# Patient Record
Sex: FEMALE | Race: WHITE | Employment: FULL TIME | URBAN - METROPOLITAN AREA
[De-identification: names, ages, dates, MRNs, and addresses within clinical notes are randomized per-mention and may not be internally consistent; named-entity substitution may affect disease eponyms.]

---

## 2017-01-23 ENCOUNTER — OFFICE VISIT (OUTPATIENT)
Dept: INTERNAL MEDICINE CLINIC | Age: 57
End: 2017-01-23

## 2017-01-23 VITALS
HEART RATE: 61 BPM | WEIGHT: 178 LBS | SYSTOLIC BLOOD PRESSURE: 129 MMHG | DIASTOLIC BLOOD PRESSURE: 81 MMHG | HEIGHT: 66 IN | BODY MASS INDEX: 28.61 KG/M2 | OXYGEN SATURATION: 97 % | TEMPERATURE: 97.7 F | RESPIRATION RATE: 17 BRPM

## 2017-01-23 DIAGNOSIS — I10 ESSENTIAL HYPERTENSION: Primary | ICD-10-CM

## 2017-01-23 DIAGNOSIS — E55.9 VITAMIN D DEFICIENCY: ICD-10-CM

## 2017-01-23 DIAGNOSIS — E78.5 DYSLIPIDEMIA: ICD-10-CM

## 2017-01-23 DIAGNOSIS — R23.2 HOT FLASHES: ICD-10-CM

## 2017-01-23 DIAGNOSIS — G89.29 CHRONIC NECK PAIN: ICD-10-CM

## 2017-01-23 DIAGNOSIS — M54.2 CHRONIC NECK PAIN: ICD-10-CM

## 2017-01-23 DIAGNOSIS — E66.3 OVERWEIGHT (BMI 25.0-29.9): ICD-10-CM

## 2017-01-23 RX ORDER — VENLAFAXINE HYDROCHLORIDE 37.5 MG/1
37.5 CAPSULE, EXTENDED RELEASE ORAL DAILY
Qty: 30 CAP | Refills: 3 | Status: SHIPPED | OUTPATIENT
Start: 2017-01-23 | End: 2017-03-09 | Stop reason: DRUGHIGH

## 2017-01-23 RX ORDER — BISMUTH SUBSALICYLATE 262 MG
1 TABLET,CHEWABLE ORAL DAILY
COMMUNITY
End: 2018-07-05

## 2017-01-23 NOTE — PROGRESS NOTES
HISTORY OF PRESENT ILLNESS  Sneha Schneider is a 64 y.o. female. HPI   Last here 7/25/16.  Pt is here to f/u on chronic conditions  Pt is fasting today    BP today is 129/81  Pt is not monitoring BP at home  Continues losartan 100mg daily     Pt c/o mood swings recently with hot flashes  The hot flashes have been worsening recently and she had increasing redness to her arms and face  This wakes her up at night occasionally as well   Discussed I do not recommend hormonal therapies, discussed increased risk of clotting and cancers  Discussed effexor to improve hot flashes and stabilize mood swings, discussed other treatment options as well to include brisdelle and gabapentin   Advised starting 37.5mg daily at this time and can increase up as needed down the road   She is amenable to starting this medication, will take qhs   Discussed if needed she can double this in about 6 weeks and she can call office so I can adjust the prescription for her- she expresses understanding    Reviewed last labs 7/16  Will repeat labs today- pt is fasting    Wt is up 9 lbs since last visit   She had lost about 20 lbs but has gained some wt back  Pt has been exercising less frequently recently d/t neck pain   Discussed diet and weight loss   Discussed her cholesterol may be elevated with the weight gain  Discussed effexor is weight neutral and should not contribute to weight gain    Pt has chronic neck pain and stiffness, worsening recently   She follows with orthopedics for this- Dr. Mikala Mitchell   She has gotten injections for this in the past   She has not been taking anything OTC for this pain   Pt will follow up with Dr. Mikala Mitchell for her neck pain   Pt states recent worsening in neck pain is prohibiting her exercise  She will schedule follow up appointment     Continues vit D 1000 units daily   She also takes centrum silver multi-vitamin daily     Reviewed DEXA 8/10/16: normal  Will likely wait to repeat in about 4 years d/t normal result and young age       PREVENTIVE:    Colonoscopy: 9/14, Dr. Jackie Reyes, repeat 10 years  Pap: Dr. Richard Aparicio, 1/13, declines further  Mammogram: declines  Dexa: 8/10/16 normal  Flu shot: declines    Eye exam: 12/16/15, every other year  Lipids: 7/16,           Patient Active Problem List    Diagnosis Date Noted    Urgency of urination 09/06/2013    Nocturia 09/06/2013    Mixed incontinence 09/06/2013    Cystocele, lateral 09/06/2013    Rectocele 09/06/2013    Postmenopausal atrophic vaginitis 09/06/2013    Obesity 02/12/2013    Infected sebaceous cyst Left post auricular. 08/24/2011    HTN (hypertension) 12/15/2009    Chronic neck pain 12/15/2009    DJD (degenerative joint disease) 12/15/2009    Vitamin D deficiency 12/15/2009    Fibrocystic disease of breast 12/15/2009     Current Outpatient Prescriptions   Medication Sig Dispense Refill    losartan (COZAAR) 100 mg tablet Take 1 Tab by mouth daily. 30 Tab 6    Cholecalciferol, Vitamin D3, (VITAMIN D3) 1,000 unit cap Take  by mouth daily.        Past Surgical History   Procedure Laterality Date    Hx hysterectomy       partial    Hx orthopaedic       right thumb fracture repair    Hx cyst removal  02/2015     from jaw and back     Hx cyst removal  02/2015     from finger      Lab Results  Component Value Date/Time   WBC 5.9 07/25/2016 01:39 PM   HGB 13.1 07/25/2016 01:39 PM   HCT 39.0 07/25/2016 01:39 PM   PLATELET 332 84/75/4524 01:39 PM   MCV 91 07/25/2016 01:39 PM       Lab Results  Component Value Date/Time   Cholesterol, total 216 07/25/2016 01:39 PM   HDL Cholesterol 70 07/25/2016 01:39 PM   LDL, calculated 111 07/25/2016 01:39 PM   Triglyceride 174 07/25/2016 01:39 PM       Lab Results  Component Value Date/Time   GFR est  07/25/2016 01:39 PM   GFR est non-AA 92 07/25/2016 01:39 PM   Creatinine 0.73 07/25/2016 01:39 PM   BUN 16 07/25/2016 01:39 PM   Sodium 140 07/25/2016 01:39 PM   Potassium 4.9 07/25/2016 01:39 PM   Chloride 98 07/25/2016 01:39 PM   CO2 24 07/25/2016 01:39 PM         Review of Systems   Respiratory: Negative for shortness of breath. Cardiovascular: Negative for chest pain. Musculoskeletal: Positive for neck pain. Physical Exam   Constitutional: She is oriented to person, place, and time. She appears well-developed and well-nourished. No distress. HENT:   Head: Normocephalic and atraumatic. Mouth/Throat: Oropharynx is clear and moist. No oropharyngeal exudate. Eyes: Conjunctivae and EOM are normal. Right eye exhibits no discharge. Left eye exhibits no discharge. Neck: Normal range of motion. Neck supple. Cardiovascular: Normal rate, regular rhythm, normal heart sounds and intact distal pulses. Exam reveals no gallop and no friction rub. No murmur heard. Pulmonary/Chest: Effort normal and breath sounds normal. No respiratory distress. She has no wheezes. She has no rales. She exhibits no tenderness. Musculoskeletal: Normal range of motion. She exhibits no edema, tenderness or deformity. Lymphadenopathy:     She has no cervical adenopathy. Neurological: She is alert and oriented to person, place, and time. Coordination normal.   Skin: Skin is warm and dry. No rash noted. She is not diaphoretic. No erythema. No pallor. Psychiatric: She has a normal mood and affect. Her behavior is normal.       ASSESSMENT and PLAN    ICD-10-CM ICD-9-CM    1. Essential hypertension    Well controlled on losartan 100mg daily, continue no change to dose   Q12 407.5 METABOLIC PANEL, COMPREHENSIVE   2. Hot flashes    Will start effexor 37.5mg daily, discussed can increase to 75mg after 6 weeks if needed, if this does not work will consider low dose paxil next. R23.2 782.62    3.  Dyslipidemia    Diet controlled, however she gained 9 lbs since last visit, will focus on weight loss, no medications needed for now, repeat lipids today   E78.5 272.4 LIPID PANEL   4. Vitamin D deficiency    Taking 1000 units per day, at goal last check   E55.9 268.9    5. Overweight (BMI 25.0-29. 9)    Wt is up 9 lbs, had holiday eating, will focus on portion control and increase exercise     E66.3 278.02    6. Chronic neck pain    Will schedule follow up with Dr. Mansi Tony for this, saw him around 2013 for this,  had some injections at that time which improved her sxs, this is recurrent issue for her. M54.2 723.1     G89.29 338.29         Written by Yoandy William, as dictated by Whitley Rolle MD.    Current diagnosis and concerns discussed with pt at length. Understands risks and benefits or current treatment plan and medications and accepts the treatment and medication with any possible risks.   Pt asks appropriate questions which were answered.   Pt instructed to call with any concerns or problems.

## 2017-01-24 LAB
ALBUMIN SERPL-MCNC: 4.2 G/DL (ref 3.5–5.5)
ALBUMIN/GLOB SERPL: 1.7 {RATIO} (ref 1.1–2.5)
ALP SERPL-CCNC: 79 IU/L (ref 39–117)
ALT SERPL-CCNC: 19 IU/L (ref 0–32)
AST SERPL-CCNC: 18 IU/L (ref 0–40)
BILIRUB SERPL-MCNC: 0.3 MG/DL (ref 0–1.2)
BUN SERPL-MCNC: 13 MG/DL (ref 6–24)
BUN/CREAT SERPL: 16 (ref 9–23)
CALCIUM SERPL-MCNC: 9.1 MG/DL (ref 8.7–10.2)
CHLORIDE SERPL-SCNC: 99 MMOL/L (ref 96–106)
CHOLEST SERPL-MCNC: 190 MG/DL (ref 100–199)
CO2 SERPL-SCNC: 25 MMOL/L (ref 18–29)
CREAT SERPL-MCNC: 0.79 MG/DL (ref 0.57–1)
GLOBULIN SER CALC-MCNC: 2.5 G/DL (ref 1.5–4.5)
GLUCOSE SERPL-MCNC: 104 MG/DL (ref 65–99)
HDLC SERPL-MCNC: 62 MG/DL
LDLC SERPL CALC-MCNC: 99 MG/DL (ref 0–99)
POTASSIUM SERPL-SCNC: 4.7 MMOL/L (ref 3.5–5.2)
PROT SERPL-MCNC: 6.7 G/DL (ref 6–8.5)
SODIUM SERPL-SCNC: 139 MMOL/L (ref 134–144)
TRIGL SERPL-MCNC: 144 MG/DL (ref 0–149)
VLDLC SERPL CALC-MCNC: 29 MG/DL (ref 5–40)

## 2017-03-09 RX ORDER — VENLAFAXINE HYDROCHLORIDE 75 MG/1
75 CAPSULE, EXTENDED RELEASE ORAL DAILY
Qty: 30 CAP | Refills: 6 | Status: SHIPPED | OUTPATIENT
Start: 2017-03-09 | End: 2018-01-24

## 2017-03-28 RX ORDER — LOSARTAN POTASSIUM 100 MG/1
100 TABLET ORAL DAILY
Qty: 30 TAB | Refills: 6 | Status: SHIPPED | OUTPATIENT
Start: 2017-03-28 | End: 2017-12-04 | Stop reason: SDUPTHER

## 2017-07-24 ENCOUNTER — OFFICE VISIT (OUTPATIENT)
Dept: INTERNAL MEDICINE CLINIC | Age: 57
End: 2017-07-24

## 2017-07-24 VITALS
RESPIRATION RATE: 17 BRPM | DIASTOLIC BLOOD PRESSURE: 90 MMHG | HEIGHT: 66 IN | HEART RATE: 64 BPM | WEIGHT: 174 LBS | SYSTOLIC BLOOD PRESSURE: 131 MMHG | OXYGEN SATURATION: 96 % | BODY MASS INDEX: 27.97 KG/M2 | TEMPERATURE: 97.5 F

## 2017-07-24 DIAGNOSIS — Z00.00 ANNUAL PHYSICAL EXAM: Primary | ICD-10-CM

## 2017-07-24 DIAGNOSIS — N95.1 HOT FLASH, MENOPAUSAL: ICD-10-CM

## 2017-07-24 DIAGNOSIS — M54.2 CHRONIC NECK PAIN: ICD-10-CM

## 2017-07-24 DIAGNOSIS — G89.29 CHRONIC NECK PAIN: ICD-10-CM

## 2017-07-24 DIAGNOSIS — I10 ESSENTIAL HYPERTENSION: ICD-10-CM

## 2017-07-24 RX ORDER — MELOXICAM 15 MG/1
15 TABLET ORAL DAILY
COMMUNITY
End: 2019-08-13 | Stop reason: SDUPTHER

## 2017-07-24 RX ORDER — CYCLOBENZAPRINE HCL 10 MG
TABLET ORAL
COMMUNITY
End: 2018-07-05

## 2017-07-24 RX ORDER — GABAPENTIN 100 MG/1
300 CAPSULE ORAL DAILY
COMMUNITY
End: 2019-02-13 | Stop reason: SDUPTHER

## 2017-07-24 NOTE — PROGRESS NOTES
HISTORY OF PRESENT ILLNESS  Jarad Diaz is a 62 y.o. female. HPI   Last here 17.  Pt is here to f/u on chronic conditions    BP today is 131/90  BP per ortho around 142/87 per pt  She is not monitoring her BP at home  Continues losartan 100mg daily     Pt c/o neck pain, chronic issue for her  She is following with Dr. Myesha Camilo for this   She is getting injections per Dr. Emma Rudd  Pt has steroid injections pending 17 for this   Pt is also now taking gabapentin 100mg BID- 1 in AM, 2 in PM, meloxicam, and flexeril  These medications are not fully controlling her pain currently   Discussed increasing dose of gabapentin, she declines for now    Reviewed last labs   LDL 99, kidney nl, liver nl, fasting sugar mildly elevated  Repeat labs today    Wt is down 4 lbs since last visit  Discussed diet and continued weight loss     Last visit, I started her on effexor 37.5mg daily for hot flashes  Over email I increased this to 75mg daily   She states this is not improving her sx    She does not notice improvement on higher dose  Discussed if not helping we can stop this for now  Advised how to taper off of effexor to avoid SE  Declines increase in gabapentin dose for this    Continues vit D 1000 units daily   She also takes centrum silver multi-vitamin daily         PREVENTIVE:    Colonoscopy: , Dr. Earnestine Ahumada, repeat 10 years  Pap: Dr. Joselin Levin, , declines further  Mammogram: declines  DEXA: 8/10/16 nl, will repeat 3-4 years  Tdap: declines  Pneumovax: not yet needed  [id number]: not yet needed  Zostavax: not yet needed  Flu shot: declines    Eye exam: 12/16/15, every other year  EK/16 normal sinus rhythm   Hep C screen:  negative  Lipids:  LDL 99       Patient Active Problem List    Diagnosis Date Noted    Urgency of urination 2013    Nocturia 2013    Mixed incontinence 2013    Cystocele, lateral 2013    Rectocele 2013    Postmenopausal atrophic vaginitis 09/06/2013    Obesity 02/12/2013    Infected sebaceous cyst Left post auricular. 08/24/2011    HTN (hypertension) 12/15/2009    Chronic neck pain 12/15/2009    DJD (degenerative joint disease) 12/15/2009    Vitamin D deficiency 12/15/2009    Fibrocystic disease of breast 12/15/2009     Current Outpatient Prescriptions   Medication Sig Dispense Refill    gabapentin (NEURONTIN) 100 mg capsule Take  by mouth three (3) times daily.  cyclobenzaprine (FLEXERIL) 10 mg tablet Take  by mouth three (3) times daily as needed for Muscle Spasm(s).  meloxicam (MOBIC) 15 mg tablet Take 15 mg by mouth daily.  losartan (COZAAR) 100 mg tablet Take 1 Tab by mouth daily. 30 Tab 6    venlafaxine-SR (EFFEXOR-XR) 75 mg capsule Take 1 Cap by mouth daily. 30 Cap 6    multivitamin (ONE A DAY) tablet Take 1 Tab by mouth daily.  Cholecalciferol, Vitamin D3, (VITAMIN D3) 1,000 unit cap Take  by mouth daily.        Past Surgical History:   Procedure Laterality Date    HX CYST REMOVAL  02/2015    from jaw and back     HX CYST REMOVAL  02/2015    from finger    HX HYSTERECTOMY      partial    HX ORTHOPAEDIC      right thumb fracture repair      Lab Results  Component Value Date/Time   WBC 5.9 07/25/2016 01:39 PM   HGB 13.1 07/25/2016 01:39 PM   HCT 39.0 07/25/2016 01:39 PM   PLATELET 076 25/74/9717 01:39 PM   MCV 91 07/25/2016 01:39 PM     Lab Results  Component Value Date/Time   Cholesterol, total 190 01/23/2017 08:51 AM   HDL Cholesterol 62 01/23/2017 08:51 AM   LDL, calculated 99 01/23/2017 08:51 AM   Triglyceride 144 01/23/2017 08:51 AM     Lab Results  Component Value Date/Time   GFR est non-AA 84 01/23/2017 08:51 AM   GFR est AA 97 01/23/2017 08:51 AM   Creatinine 0.79 01/23/2017 08:51 AM   BUN 13 01/23/2017 08:51 AM   Sodium 139 01/23/2017 08:51 AM   Potassium 4.7 01/23/2017 08:51 AM   Chloride 99 01/23/2017 08:51 AM   CO2 25 01/23/2017 08:51 AM          Review of Systems   Respiratory: Negative for shortness of breath. Cardiovascular: Negative for chest pain. Musculoskeletal: Positive for neck pain. Physical Exam   Constitutional: She is oriented to person, place, and time. She appears well-developed and well-nourished. No distress. HENT:   Head: Normocephalic and atraumatic. Right Ear: External ear normal.   Left Ear: External ear normal.   Mouth/Throat: Oropharynx is clear and moist. No oropharyngeal exudate. Eyes: Conjunctivae and EOM are normal. Pupils are equal, round, and reactive to light. Right eye exhibits no discharge. Left eye exhibits no discharge. No scleral icterus. Neck: Normal range of motion. Neck supple. No carotid bruits     Cardiovascular: Normal rate, regular rhythm, normal heart sounds and intact distal pulses. Exam reveals no gallop and no friction rub. No murmur heard. Pulmonary/Chest: Effort normal and breath sounds normal. No respiratory distress. She has no wheezes. She has no rales. She exhibits no tenderness. Abdominal: Soft. She exhibits no distension and no mass. There is no tenderness. There is no rebound and no guarding. Musculoskeletal: Normal range of motion. She exhibits no edema, tenderness or deformity. Lymphadenopathy:     She has no cervical adenopathy. Neurological: She is alert and oriented to person, place, and time. Coordination normal.   Skin: Skin is warm and dry. No rash noted. She is not diaphoretic. No erythema. No pallor. Psychiatric: She has a normal mood and affect. Her behavior is normal.       ASSESSMENT and PLAN    ICD-10-CM ICD-9-CM    1. Annual physical exam    Doing a great job with w/l, continue, EKG completed last year, no need to complete today, no new signs or sx, COLO and DEXA UTD, declines mammogram and pelvic exam, declines immunizations. Labs ordered P31.13 V54.8 METABOLIC PANEL, COMPREHENSIVE      HEMOGLOBIN A1C WITH EAG      CBC W/O DIFF      TSH 3RD GENERATION   2.  Hot flash, menopausal    Taper off effexor, this has not been effective   N95.1 627.2    3. Essential hypertension    Controlled on losartan. I10 401.9    4. Chronic neck pain    [facility], getting neck injections next week with Dr. Damion Womack. M54.2 723.1     G89.29 338.29           Written by Belen Reid, as dictated by Ki Hooks MD.    Current diagnosis and concerns discussed with pt at length. Understands risks and benefits or current treatment plan and medications and accepts the treatment and medication with any possible risks.   Pt asks appropriate questions which were answered.   Pt instructed to call with any concerns or problems. This note will not be viewable in [address]

## 2017-07-24 NOTE — PATIENT INSTRUCTIONS
effexor every other day for 2-4 weeks     Then decrease to 3 times per week for 2 weeks     Then stop     Schedule eye exam in December    Labs today

## 2017-07-24 NOTE — MR AVS SNAPSHOT
Visit Information Date & Time Provider Department Dept. Phone Encounter #  
 7/24/2017  8:30 AM Amor Londono, [address] [place] [address],[address] [phone] [id number] Follow-up Instructions Return in about 6 months (around 1/24/2018). Upcoming Health Maintenance Date Due DTaP/Tdap/Td series (1 - Tdap) 7/18/1981 BREAST CANCER SCRN MAMMOGRAM 2/4/2017 INFLUENZA AGE 9 TO ADULT 8/1/2017 PAP AKA CERVICAL CYTOLOGY 1/23/2020 COLONOSCOPY 9/11/2024 Allergies as of 7/24/2017  Review Complete On: 1/23/2017 By: Amor Londono MD  
 No Known Allergies Current Immunizations  Reviewed on 7/24/2017 No immunizations on file. Reviewed by Amor Londono MD on 7/24/2017 at  8:27 AM  
You Were Diagnosed With   
  
 Codes Comments Annual physical exam    -  Primary ICD-10-CM: Z00.00 ICD-9-CM: V70.0 Hot flash, menopausal     ICD-10-CM: N95.1 ICD-9-CM: 627.2 Essential hypertension     ICD-10-CM: I10 
ICD-9-CM: 401.9 Chronic neck pain     ICD-10-CM: M54.2, G89.29 ICD-9-CM: 723.1, 338.29 Vitals BP Pulse Temp Resp Height(growth percentile) SpO2  
 131/90 (BP 1 Location: Left arm, BP Patient Position: Sitting) 64 97.5 °F (36.4 °C) (Oral) 17 5' 6\" (1.676 m) 96% OB Status Smoking Status Postmenopausal Former Smoker Preferred Pharmacy Pharmacy Name Phone [id number] [place], [address] [phone] Your Updated Medication List  
  
   
This list is accurate as of: 7/24/17  8:35 AM.  Always use your most recent med list.  
  
  
  
  
 cyclobenzaprine 10 mg tablet Commonly known as:  FLEXERIL Take  by mouth three (3) times daily as needed for Muscle Spasm(s). gabapentin 100 mg capsule Commonly known as:  NEURONTIN Take  by mouth three (3) times daily. losartan 100 mg tablet Commonly known as:  COZAAR  
 Take 1 Tab by mouth daily. MOBIC 15 mg tablet Generic drug:  meloxicam  
Take 15 mg by mouth daily. multivitamin tablet Commonly known as:  ONE A DAY Take 1 Tab by mouth daily. venlafaxine-SR 75 mg capsule Commonly known as:  EFFEXOR-XR Take 1 Cap by mouth daily. VITAMIN D3 1,000 unit Cap Generic drug:  cholecalciferol Take  by mouth daily. We Performed the Following CBC W/O DIFF [56774 CPT(R)] HEMOGLOBIN A1C WITH EAG [50370 CPT(R)] METABOLIC PANEL, COMPREHENSIVE [30472 CPT(R)] TSH 3RD GENERATION [77327 CPT(R)] Follow-up Instructions Return in about 6 months (around 1/24/2018). Patient Instructions   
effexor every other day for 2-4 weeks Then decrease to 3 times per week for 2 weeks Then stop Schedule eye exam in December Labs today Introducing [facility]! Dear Yady Ang: Thank you for requesting a [employer] account. Our records indicate that you already have an active [employer] account. You can access your account anytime at [email or web address] Did you know that you can access your hospital and ER discharge instructions at any time in [employer]? You can also review all of your test results from your hospital stay or ER visit. Additional Information If you have questions, please visit the Frequently Asked Questions section of the [employer] website at [email or web address] Remember, [employer] is NOT to be used for urgent needs. For medical emergencies, dial 911. Now available from your iPhone and Android! Please provide this summary of care documentation to your next provider. Your primary care clinician is listed as Amadou Krause. If you have any questions after today's visit, please call [phone].

## 2017-07-25 LAB
ALBUMIN SERPL-MCNC: 4.3 G/DL (ref 3.5–5.5)
ALBUMIN/GLOB SERPL: 1.7 {RATIO} (ref 1.2–2.2)
ALP SERPL-CCNC: 80 IU/L (ref 39–117)
ALT SERPL-CCNC: 16 IU/L (ref 0–32)
AST SERPL-CCNC: 19 IU/L (ref 0–40)
BILIRUB SERPL-MCNC: 0.5 MG/DL (ref 0–1.2)
BUN SERPL-MCNC: 18 MG/DL (ref 6–24)
BUN/CREAT SERPL: 25 (ref 9–23)
CALCIUM SERPL-MCNC: 9.4 MG/DL (ref 8.7–10.2)
CHLORIDE SERPL-SCNC: 98 MMOL/L (ref 96–106)
CO2 SERPL-SCNC: 29 MMOL/L (ref 18–29)
CREAT SERPL-MCNC: 0.73 MG/DL (ref 0.57–1)
ERYTHROCYTE [DISTWIDTH] IN BLOOD BY AUTOMATED COUNT: 12.8 % (ref 12.3–15.4)
EST. AVERAGE GLUCOSE BLD GHB EST-MCNC: 111 MG/DL
GLOBULIN SER CALC-MCNC: 2.6 G/DL (ref 1.5–4.5)
GLUCOSE SERPL-MCNC: 116 MG/DL (ref 65–99)
HBA1C MFR BLD: 5.5 % (ref 4.8–5.6)
HCT VFR BLD AUTO: 40.5 % (ref 34–46.6)
HGB BLD-MCNC: 13.5 G/DL (ref 11.1–15.9)
MCH RBC QN AUTO: 31.3 PG (ref 26.6–33)
MCHC RBC AUTO-ENTMCNC: 33.3 G/DL (ref 31.5–35.7)
MCV RBC AUTO: 94 FL (ref 79–97)
PLATELET # BLD AUTO: 262 X10E3/UL (ref 150–379)
POTASSIUM SERPL-SCNC: 5.2 MMOL/L (ref 3.5–5.2)
PROT SERPL-MCNC: 6.9 G/DL (ref 6–8.5)
RBC # BLD AUTO: 4.32 X10E6/UL (ref 3.77–5.28)
SODIUM SERPL-SCNC: 139 MMOL/L (ref 134–144)
TSH SERPL DL<=0.005 MIU/L-ACNC: 1.57 UIU/ML (ref 0.45–4.5)
WBC # BLD AUTO: 5.1 X10E3/UL (ref 3.4–10.8)

## 2017-12-04 RX ORDER — LOSARTAN POTASSIUM 100 MG/1
TABLET ORAL
Qty: 30 TAB | Refills: 6 | Status: SHIPPED | OUTPATIENT
Start: 2017-12-04 | End: 2017-12-04 | Stop reason: SDUPTHER

## 2018-01-24 ENCOUNTER — OFFICE VISIT (OUTPATIENT)
Dept: INTERNAL MEDICINE CLINIC | Age: 58
End: 2018-01-24

## 2018-01-24 VITALS
BODY MASS INDEX: 30.53 KG/M2 | SYSTOLIC BLOOD PRESSURE: 143 MMHG | TEMPERATURE: 97.5 F | HEART RATE: 67 BPM | WEIGHT: 190 LBS | OXYGEN SATURATION: 96 % | HEIGHT: 66 IN | RESPIRATION RATE: 16 BRPM | DIASTOLIC BLOOD PRESSURE: 85 MMHG

## 2018-01-24 DIAGNOSIS — M25.551 HIP PAIN, RIGHT: ICD-10-CM

## 2018-01-24 DIAGNOSIS — M25.551 RIGHT HIP PAIN: Primary | ICD-10-CM

## 2018-01-24 DIAGNOSIS — N39.46 MIXED INCONTINENCE: ICD-10-CM

## 2018-01-24 DIAGNOSIS — I10 ESSENTIAL HYPERTENSION: Primary | ICD-10-CM

## 2018-01-24 DIAGNOSIS — R73.01 IFG (IMPAIRED FASTING GLUCOSE): ICD-10-CM

## 2018-01-24 DIAGNOSIS — E66.9 OBESITY (BMI 30.0-34.9): ICD-10-CM

## 2018-01-24 DIAGNOSIS — R23.2 HOT FLASHES: ICD-10-CM

## 2018-01-24 DIAGNOSIS — G89.29 CHRONIC NECK PAIN: ICD-10-CM

## 2018-01-24 DIAGNOSIS — M54.2 CHRONIC NECK PAIN: ICD-10-CM

## 2018-01-24 RX ORDER — METHYLPREDNISOLONE 4 MG/1
TABLET ORAL
Qty: 1 DOSE PACK | Refills: 0 | Status: SHIPPED | OUTPATIENT
Start: 2018-01-24 | End: 2018-07-05

## 2018-01-24 RX ORDER — HYDROCHLOROTHIAZIDE 25 MG/1
25 TABLET ORAL DAILY
Qty: 30 TAB | Refills: 4 | Status: SHIPPED | OUTPATIENT
Start: 2018-01-24 | End: 2018-06-26 | Stop reason: SDUPTHER

## 2018-01-24 NOTE — PROGRESS NOTES
HISTORY OF PRESENT ILLNESS  Zaid Ugarte is a 62 y.o. female. HPI   Last here 7/24/17. Pt is here to f/u on chronic conditions.     BP is 160/81, will repeat this today   She is not monitoring her BP at home  Continues losartan 100mg daily   Pt took this medication this AM  Ordered HCTZ    Wt is up 16 lbs since last visit  Pt reports dietary indiscretion over the holidays  Pt has been eating home cooked meals with her [place] relatives  Pt has been trying to decrease her portion sizes  Discussed diet and weight loss      Reviewed last labs 7/17  Will get labs today     Continues vit D OTC 1000U daily   She also takes centrum silver multi-vitamin daily     Pt has tapered off of effexor in order to avoid SE  Pt has been taking another \"menopause tablet from [employer]\", which improves her hot flashes  Recall pt tried and failed effexor 37.5mg and 75mg daily for hot flashes     Pt follows with Dr. Agustín Walter (ortho) for neck pain  Reviewed notes 8/17/17: neck pain, stenosis in neck, giving gabapentin, please complete exercises at home  Pt also follows with Dr. Sravan Owens (ortho) for neck pain  Last visit was 8/3/17   Pt received injections at that time  Pt has been taking gabapentin 2 tabs in the AM for her neck pain, which improves her sx  Discussed gabapentin also helping with her hot flashes    Pt c/o R hip pain x 4 months  Pt has had similar sx in the past (a few years) and was told that she had bursitis   Pt states that it is difficult for her to bend down and ascend/descend the stairs as a result  Pt denies her pain radiating to her RLE or having bladder/bowel incontinence   Pt has been taking meloxicam, but is unsure if this medication improves her pain  Pt has not told Bertha Walter or Sravan Owens (ortho) about her R hip pain  Discussed wt gain contributing to some of her sx  Discussed her sx being indicative of bursitis   Ordered medrol dosepack      PREVENTIVE:    Colonoscopy: 9/14, Dr. Leyva Nurse, repeat 10 years  Pap:  Redd, , due, referred  Mammogram: declines  DEXA: 8/10/16, nl, will repeat 3-4 years  Tdap: declines  Pneumovax: not yet needed  [id number]: not yet needed  Zostavax: not yet needed  Flu shot: declines    Eye exam: 12/16/15, every other year  EK/16, normal sinus rhythm   Hep C screen: , negative  A1C:  5.5  Lipids:  LDL 99        Patient Active Problem List    Diagnosis Date Noted    Urgency of urination 2013    Nocturia 2013    Mixed incontinence 2013    Cystocele, lateral 2013    Rectocele 2013    Postmenopausal atrophic vaginitis 2013    Obesity 2013    Infected sebaceous cyst Left post auricular. 2011    HTN (hypertension) 12/15/2009    Chronic neck pain 12/15/2009    DJD (degenerative joint disease) 12/15/2009    Vitamin D deficiency 12/15/2009    Fibrocystic disease of breast 12/15/2009     Current Outpatient Prescriptions   Medication Sig Dispense Refill    losartan (COZAAR) 100 mg tablet Take 1 Tab by mouth daily. 30 Tab 0    gabapentin (NEURONTIN) 100 mg capsule Take 300 mg by mouth daily. Indications: TAKE 2 TABS EVERY MORNING      meloxicam (MOBIC) 15 mg tablet Take 15 mg by mouth daily.  multivitamin (ONE A DAY) tablet Take 1 Tab by mouth daily.  cyclobenzaprine (FLEXERIL) 10 mg tablet Take  by mouth three (3) times daily as needed for Muscle Spasm(s).  venlafaxine-SR (EFFEXOR-XR) 75 mg capsule Take 1 Cap by mouth daily. 30 Cap 6    Cholecalciferol, Vitamin D3, (VITAMIN D3) 1,000 unit cap Take  by mouth daily.        Past Surgical History:   Procedure Laterality Date    HX CYST REMOVAL  2015    from jaw and back     HX CYST REMOVAL  2015    from finger    HX HYSTERECTOMY      partial    HX ORTHOPAEDIC      right thumb fracture repair      Lab Results  Component Value Date/Time   WBC 5.1 2017 08:45 AM   HGB 13.5 2017 08:45 AM   HCT 40.5 2017 08:45 AM   PLATELET 887  08:45 AM   MCV 94 07/24/2017 08:45 AM     Lab Results  Component Value Date/Time   Cholesterol, total 190 01/23/2017 08:51 AM   HDL Cholesterol 62 01/23/2017 08:51 AM   LDL, calculated 99 01/23/2017 08:51 AM   Triglyceride 144 01/23/2017 08:51 AM     Lab Results  Component Value Date/Time   GFR est non-AA 92 07/24/2017 08:45 AM   GFR est  07/24/2017 08:45 AM   Creatinine 0.73 07/24/2017 08:45 AM   BUN 18 07/24/2017 08:45 AM   Sodium 139 07/24/2017 08:45 AM   Potassium 5.2 07/24/2017 08:45 AM   Chloride 98 07/24/2017 08:45 AM   CO2 29 07/24/2017 08:45 AM        Review of Systems   Respiratory: Negative for shortness of breath. Cardiovascular: Negative for chest pain. Musculoskeletal: Positive for joint pain. Physical Exam   Constitutional: She is oriented to person, place, and time. She appears well-developed and well-nourished. No distress. HENT:   Head: Normocephalic and atraumatic. Mouth/Throat: Oropharynx is clear and moist. No oropharyngeal exudate. Eyes: Conjunctivae and EOM are normal. Right eye exhibits no discharge. Left eye exhibits no discharge. Neck: Normal range of motion. Neck supple. Cardiovascular: Normal rate, regular rhythm, normal heart sounds and intact distal pulses. Exam reveals no gallop and no friction rub. No murmur heard. Pulmonary/Chest: Effort normal and breath sounds normal. No respiratory distress. She has no wheezes. She has no rales. She exhibits no tenderness. Musculoskeletal: Normal range of motion. She exhibits tenderness (TTP over greater trochanter on R). She exhibits no edema or deformity. 5/5 strength BLE  LLE stronger than RLE  No pain with internal/external rotation on RLE  Negative SLR on RLE   Lymphadenopathy:     She has no cervical adenopathy. Neurological: She is alert and oriented to person, place, and time. Coordination normal.   Skin: Skin is warm and dry. No rash noted. She is not diaphoretic. No erythema. No pallor. Psychiatric: She has a normal mood and affect. Her behavior is normal.       ASSESSMENT and PLAN    ICD-10-CM ICD-9-CM    1. Essential hypertension    Add HCTZ 25mg daily, continue losartan 100mg    I10 401.9 LIPID PANEL      METABOLIC PANEL, BASIC      HEMOGLOBIN A1C WITH EAG   2. Mixed incontinence    Due for pelvic exam, will see gyn for this, prev saw Dr. Sherrell Loza in the past, not on any meds currently   N39.46 788.33 REFERRAL TO OBSTETRICS AND GYNECOLOGY      LIPID PANEL      METABOLIC PANEL, BASIC      HEMOGLOBIN A1C WITH EAG   3. Hot flashes    Not interested in any meds, overall improving, of note she is on gabapentin which likely improves her sx as well   R23.2 782.62 LIPID PANEL      METABOLIC PANEL, BASIC      HEMOGLOBIN A1C WITH EAG   4. Chronic neck pain    Improved with gabapentin, sees Bertha Travis and Wing   M54.2 723.1 LIPID PANEL    I44.25 600.50 METABOLIC PANEL, BASIC      HEMOGLOBIN A1C WITH EAG   5. Hip pain, right    Will check plain film, possible bursitis, will treat with medrol dosepack, ortho if not improving    M25.551 719.45 LIPID PANEL      METABOLIC PANEL, BASIC      HEMOGLOBIN A1C WITH EAG      XR HIP RT W OR WO PELV 2-3 VWS   6. Obesity (BMI 30.0-34. 9)    Wt up 15 lbs, addressed this with her, she needs to focus on portion control more aggressively, she reports a fair amount of dietary indiscretion of late d/t family members and will start monitoring caloric intake    E66.9 278.00 LIPID PANEL      METABOLIC PANEL, BASIC      HEMOGLOBIN A1C WITH EAG   7. IFG (impaired fasting glucose)    Check a1c   R73.01 790.21 LIPID PANEL      METABOLIC PANEL, BASIC      HEMOGLOBIN A1C WITH EAG        Depression screen reviewed and negative. Scribed by Doris Georges of [address] [place] [address] [address], as dictated by Dr. Juan M Cheung. Current diagnosis and concerns discussed with pt at length.  Pt understands risks and benefits or current treatment plan and medications, and accepts the treatment and medication with any possible risks. Pt asks appropriate questions, which were answered. Pt was instructed to call with any concerns or problems. This note will not be viewable in [address]

## 2018-01-24 NOTE — MR AVS SNAPSHOT
[address] 
[phone] Patient: Ana Thomas MRN: [id number] MSQ:[id number] Visit Information Date & Time Provider Department Dept. Phone Encounter #  
 1/24/2018  8:30 AM Abilio Santizock, [address] [place] [address],[address] [phone] [id number] Follow-up Instructions Return in about 3 months (around 4/24/2018). Upcoming Health Maintenance Date Due DTaP/Tdap/Td series (1 - Tdap) 7/18/1981 PAP AKA CERVICAL CYTOLOGY 1/23/2020 BREAST CANCER SCRN MAMMOGRAM 1/24/2020 COLONOSCOPY 9/11/2024 Allergies as of 1/24/2018  Review Complete On: 1/24/2018 By: Nneka Dotson LPN No Known Allergies Current Immunizations  Reviewed on 7/24/2017 No immunizations on file. Not reviewed this visit You Were Diagnosed With   
  
 Codes Comments Essential hypertension    -  Primary ICD-10-CM: I10 
ICD-9-CM: 401.9 Mixed incontinence     ICD-10-CM: N39.46 
ICD-9-CM: 788.33 Hot flashes     ICD-10-CM: R23.2 ICD-9-CM: 782.62 Chronic neck pain     ICD-10-CM: M54.2, G89.29 ICD-9-CM: 723.1, 338.29 Hip pain, right     ICD-10-CM: M25.551 ICD-9-CM: 719.45 Obesity (BMI 30.0-34.9)     ICD-10-CM: V18.0 ICD-9-CM: 278.00 IFG (impaired fasting glucose)     ICD-10-CM: R73.01 
ICD-9-CM: 790.21 Vitals BP Pulse Temp Resp Height(growth percentile) Weight(growth percentile) 143/85 (BP 1 Location: Left arm, BP Patient Position: Sitting) 67 97.5 °F (36.4 °C) (Oral) 16 5' 6\" (1.676 m) 190 lb (86.2 kg) SpO2 BMI OB Status Smoking Status 96% 30.67 kg/m2 Postmenopausal Former Smoker Vitals History BMI and BSA Data Body Mass Index Body Surface Area  
 30.67 kg/m 2 2 m 2 Preferred Pharmacy Pharmacy Name Phone [id number] [place], [address] [phone] Your Updated Medication List  
  
   
This list is accurate as of: 1/24/18  8:46 AM.  Always use your most recent med list.  
  
  
  
  
 cyclobenzaprine 10 mg tablet Commonly known as:  FLEXERIL Take  by mouth three (3) times daily as needed for Muscle Spasm(s). gabapentin 100 mg capsule Commonly known as:  NEURONTIN Take 300 mg by mouth daily. Indications: TAKE 2 TABS EVERY MORNING  
  
 losartan 100 mg tablet Commonly known as:  COZAAR Take 1 Tab by mouth daily. methylPREDNISolone 4 mg tablet Commonly known as:  Mandy Mitten Per pack MOBIC 15 mg tablet Generic drug:  meloxicam  
Take 15 mg by mouth daily. multivitamin tablet Commonly known as:  ONE A DAY Take 1 Tab by mouth daily. VITAMIN D3 1,000 unit Cap Generic drug:  cholecalciferol Take  by mouth daily. Prescriptions Sent to Pharmacy Refills  
 methylPREDNISolone (MEDROL DOSEPACK) 4 mg tablet 0 Sig: Per pack Class: Normal  
 Pharmacy: [id number]- [address] Ph #: [phone] We Performed the Following HEMOGLOBIN A1C WITH EAG [62303 CPT(R)] LIPID PANEL [59131 CPT(R)] METABOLIC PANEL, BASIC [14355 CPT(R)] REFERRAL TO OBSTETRICS AND GYNECOLOGY [REF51 Custom] Follow-up Instructions Return in about 3 months (around 4/24/2018). To-Do List   
 01/24/2018 Imaging:  XR HIP RT W OR WO PELV 2-3 VWS Referral Information Referral ID Referred By Referred To  
  
 [id number] C[facility]lle [facility] [address] [place], [address] Visits Status Start Date End Date 1 New Request 1/24/18 1/24/19 If your referral has a status of pending review or denied, additional information will be sent to support the outcome of this decision. Introducing [facility]! Dear Bipin Mcintosh: Thank you for requesting a [employer] account. Our records indicate that you already have an active [employer] account. You can access your account anytime at [email or web address] Did you know that you can access your hospital and ER discharge instructions at any time in [employer]? You can also review all of your test results from your hospital stay or ER visit. Additional Information If you have questions, please visit the Frequently Asked Questions section of the [employer] website at [email or web address] Remember, [employer] is NOT to be used for urgent needs. For medical emergencies, dial 911. Now available from your iPhone and Android! Please provide this summary of care documentation to your next provider. Your primary care clinician is listed as Rich Onofre. If you have any questions after today's visit, please call [phone].

## 2018-01-25 LAB
BUN SERPL-MCNC: 11 MG/DL (ref 6–24)
BUN/CREAT SERPL: 16 (ref 9–23)
CALCIUM SERPL-MCNC: 9.9 MG/DL (ref 8.7–10.2)
CHLORIDE SERPL-SCNC: 100 MMOL/L (ref 96–106)
CHOLEST SERPL-MCNC: 211 MG/DL (ref 100–199)
CO2 SERPL-SCNC: 24 MMOL/L (ref 18–29)
CREAT SERPL-MCNC: 0.7 MG/DL (ref 0.57–1)
EST. AVERAGE GLUCOSE BLD GHB EST-MCNC: 117 MG/DL
GFR SERPLBLD CREATININE-BSD FMLA CKD-EPI: 111 ML/MIN/1.73
GFR SERPLBLD CREATININE-BSD FMLA CKD-EPI: 96 ML/MIN/1.73
GLUCOSE SERPL-MCNC: 117 MG/DL (ref 65–99)
HBA1C MFR BLD: 5.7 % (ref 4.8–5.6)
HDLC SERPL-MCNC: 76 MG/DL
LDLC SERPL CALC-MCNC: 109 MG/DL (ref 0–99)
POTASSIUM SERPL-SCNC: 4.4 MMOL/L (ref 3.5–5.2)
SODIUM SERPL-SCNC: 140 MMOL/L (ref 134–144)
TRIGL SERPL-MCNC: 130 MG/DL (ref 0–149)
VLDLC SERPL CALC-MCNC: 26 MG/DL (ref 5–40)

## 2018-02-03 NOTE — MR AVS SNAPSHOT
Visit Information Date & Time Provider Department Dept. Phone Encounter #  
 1/23/2017  8:30 AM Celeste Mcduffie, [address] [phone] [id number] Follow-up Instructions Return in about 6 months (around 7/23/2017). Upcoming Health Maintenance Date Due DTaP/Tdap/Td series (1 - Tdap) 7/18/1981 BREAST CANCER SCRN MAMMOGRAM 2/4/2017 PAP AKA CERVICAL CYTOLOGY 1/23/2020 COLONOSCOPY 9/11/2024 Allergies as of 1/23/2017  Review Complete On: 1/23/2017 By: Surinder Armstrong LPN No Known Allergies Current Immunizations  Never Reviewed No immunizations on file. Not reviewed this visit You Were Diagnosed With   
  
 Codes Comments Essential hypertension    -  Primary ICD-10-CM: I10 
ICD-9-CM: 401.9 Hot flashes     ICD-10-CM: R23.2 ICD-9-CM: 782.62 Dyslipidemia     ICD-10-CM: E78.5 ICD-9-CM: 272.4 Vitamin D deficiency     ICD-10-CM: E55.9 ICD-9-CM: 268.9 Overweight (BMI 25.0-29. 9)     ICD-10-CM: Z41.1 ICD-9-CM: 278.02 Chronic neck pain     ICD-10-CM: M54.2, G89.29 ICD-9-CM: 723.1, 338.29 Vitals BP Pulse Temp Resp Height(growth percentile) Weight(growth percentile) 129/81 (BP 1 Location: Left arm, BP Patient Position: Sitting) 61 97.7 °F (36.5 °C) (Oral) 17 5' 6\" (1.676 m) 178 lb (80.7 kg) SpO2 BMI OB Status Smoking Status 97% 28.73 kg/m2 Postmenopausal Former Smoker BMI and BSA Data Body Mass Index Body Surface Area 28.73 kg/m 2 1.94 m 2 Preferred Pharmacy Pharmacy Name Phone [id number] [id number], [address] [phone] Your Updated Medication List  
  
   
This list is accurate as of: 1/23/17  8:35 AM.  Always use your most recent med list.  
  
  
  
  
 losartan 100 mg tablet Commonly known as:  COZAAR Take 1 Tab by mouth daily. multivitamin tablet Commonly known as:  ONE A DAY  
 Take 1 Tab by mouth daily. venlafaxine-SR 37.5 mg capsule Commonly known as:  EFFEXOR-XR Take 1 Cap by mouth daily. VITAMIN D3 1,000 unit Cap Generic drug:  cholecalciferol Take  by mouth daily. Prescriptions Sent to Pharmacy Refills  
 venlafaxine-SR (EFFEXOR-XR) 37.5 mg capsule 3 Sig: Take 1 Cap by mouth daily. Class: Normal  
 Pharmacy: [facility][id number]- [address]  #: [phone] Route: Oral  
  
We Performed the Following LIPID PANEL [16933 CPT(R)] METABOLIC PANEL, COMPREHENSIVE [48010 CPT(R)] Follow-up Instructions Return in about 6 months (around 7/23/2017). Patient Instructions Start effexor 37.5mg Can increase to 75mg after 6 weeks Introducing [facility]! Dear Kenan White: Thank you for requesting a "[employer] account. Our records indicate that you already have an active "[employer] account. You can access your account anytime at [email or web address] Did you know that you can access your hospital and ER discharge instructions at any time in "[employer]? You can also review all of your test results from your hospital stay or ER visit. Additional Information If you have questions, please visit the Frequently Asked Questions section of the "[employer] website at [email or web address] Remember, "[employer] is NOT to be used for urgent needs. For medical emergencies, dial 911. Now available from your iPhone and Android! Please provide this summary of care documentation to your next provider. Your primary care clinician is listed as Diego Montalvo. If you have any questions after today's visit, please call [phone]. none

## 2018-02-27 ENCOUNTER — TELEPHONE (OUTPATIENT)
Dept: INTERNAL MEDICINE CLINIC | Age: 58
End: 2018-02-27

## 2018-02-27 NOTE — TELEPHONE ENCOUNTER
Called, spoke to pt. Two pt identifiers confirmed. Pt states R hip pain and would like to see a specialist.  Pt given contact info to Dr. Aneta Ag. Pt verbalized understanding of information discussed w/ no further questions at this time.

## 2018-02-27 NOTE — TELEPHONE ENCOUNTER
Patient states she needs a call back to be advised who she would be referred to for Hip Pain. Please call.  Thank you

## 2018-07-05 ENCOUNTER — APPOINTMENT (OUTPATIENT)
Dept: GENERAL RADIOLOGY | Age: 58
End: 2018-07-05
Attending: PHYSICIAN ASSISTANT
Payer: COMMERCIAL

## 2018-07-05 ENCOUNTER — HOSPITAL ENCOUNTER (EMERGENCY)
Age: 58
Discharge: HOME | End: 2018-07-05
Attending: EMERGENCY MEDICINE
Payer: COMMERCIAL

## 2018-07-05 VITALS
RESPIRATION RATE: 24 BRPM | OXYGEN SATURATION: 92 % | SYSTOLIC BLOOD PRESSURE: 138 MMHG | WEIGHT: 191.8 LBS | BODY MASS INDEX: 30.82 KG/M2 | HEART RATE: 94 BPM | DIASTOLIC BLOOD PRESSURE: 82 MMHG | HEIGHT: 66 IN | TEMPERATURE: 98.2 F

## 2018-07-05 DIAGNOSIS — S68.119A AMPUTATION OF FINGER TIP, INITIAL ENCOUNTER: Primary | ICD-10-CM

## 2018-07-05 PROCEDURE — 96375 TX/PRO/DX INJ NEW DRUG ADDON: CPT

## 2018-07-05 PROCEDURE — 74011000250 HC RX REV CODE- 250: Performed by: PHYSICIAN ASSISTANT

## 2018-07-05 PROCEDURE — 99285 EMERGENCY DEPT VISIT HI MDM: CPT

## 2018-07-05 PROCEDURE — 96365 THER/PROPH/DIAG IV INF INIT: CPT

## 2018-07-05 PROCEDURE — 74011000258 HC RX REV CODE- 258: Performed by: EMERGENCY MEDICINE

## 2018-07-05 PROCEDURE — 74011000258 HC RX REV CODE- 258: Performed by: PHYSICIAN ASSISTANT

## 2018-07-05 PROCEDURE — 73140 X-RAY EXAM OF FINGER(S): CPT

## 2018-07-05 PROCEDURE — 75810000294 HC INTERM/LAYERED WND RPR

## 2018-07-05 PROCEDURE — 77030018836 HC SOL IRR NACL ICUM -A

## 2018-07-05 PROCEDURE — 74011250636 HC RX REV CODE- 250/636: Performed by: PHYSICIAN ASSISTANT

## 2018-07-05 PROCEDURE — 96367 TX/PROPH/DG ADDL SEQ IV INF: CPT

## 2018-07-05 PROCEDURE — 90471 IMMUNIZATION ADMIN: CPT

## 2018-07-05 PROCEDURE — 90715 TDAP VACCINE 7 YRS/> IM: CPT | Performed by: PHYSICIAN ASSISTANT

## 2018-07-05 PROCEDURE — 77030008027

## 2018-07-05 PROCEDURE — 74011250636 HC RX REV CODE- 250/636: Performed by: EMERGENCY MEDICINE

## 2018-07-05 PROCEDURE — 77030031132 HC SUT NYL COVD -A

## 2018-07-05 RX ORDER — MORPHINE SULFATE 4 MG/ML
4 INJECTION INTRAVENOUS
Status: COMPLETED | OUTPATIENT
Start: 2018-07-05 | End: 2018-07-05

## 2018-07-05 RX ORDER — ONDANSETRON 2 MG/ML
4 INJECTION INTRAMUSCULAR; INTRAVENOUS
Status: COMPLETED | OUTPATIENT
Start: 2018-07-05 | End: 2018-07-05

## 2018-07-05 RX ORDER — HYDROCODONE BITARTRATE AND ACETAMINOPHEN 7.5; 325 MG/1; MG/1
1 TABLET ORAL
Qty: 10 TAB | Refills: 0 | Status: SHIPPED | OUTPATIENT
Start: 2018-07-05 | End: 2018-08-13

## 2018-07-05 RX ORDER — BUPIVACAINE HYDROCHLORIDE 5 MG/ML
5 INJECTION, SOLUTION EPIDURAL; INTRACAUDAL
Status: COMPLETED | OUTPATIENT
Start: 2018-07-05 | End: 2018-07-05

## 2018-07-05 RX ORDER — CEPHALEXIN 500 MG/1
500 CAPSULE ORAL 4 TIMES DAILY
Qty: 28 CAP | Refills: 0 | Status: SHIPPED | OUTPATIENT
Start: 2018-07-05 | End: 2018-07-12

## 2018-07-05 RX ORDER — LIDOCAINE HYDROCHLORIDE 20 MG/ML
5 INJECTION, SOLUTION EPIDURAL; INFILTRATION; INTRACAUDAL; PERINEURAL ONCE
Status: COMPLETED | OUTPATIENT
Start: 2018-07-05 | End: 2018-07-05

## 2018-07-05 RX ORDER — CEFAZOLIN SODIUM 1 G/3ML
1 INJECTION, POWDER, FOR SOLUTION INTRAMUSCULAR; INTRAVENOUS
Status: DISCONTINUED | OUTPATIENT
Start: 2018-07-05 | End: 2018-07-05

## 2018-07-05 RX ADMIN — LIDOCAINE HYDROCHLORIDE 100 MG: 20 INJECTION, SOLUTION INTRAVENOUS at 09:23

## 2018-07-05 RX ADMIN — BUPIVACAINE HYDROCHLORIDE 25 MG: 5 INJECTION, SOLUTION EPIDURAL; INTRACAUDAL; PERINEURAL at 09:23

## 2018-07-05 RX ADMIN — MORPHINE SULFATE 4 MG: 4 INJECTION INTRAVENOUS at 09:51

## 2018-07-05 RX ADMIN — CEFEPIME HYDROCHLORIDE 2 G: 2 INJECTION, POWDER, FOR SOLUTION INTRAVENOUS at 12:24

## 2018-07-05 RX ADMIN — CEFAZOLIN SODIUM 1 G: 1 INJECTION, POWDER, FOR SOLUTION INTRAMUSCULAR; INTRAVENOUS at 09:51

## 2018-07-05 RX ADMIN — ONDANSETRON 4 MG: 2 INJECTION, SOLUTION INTRAMUSCULAR; INTRAVENOUS at 09:51

## 2018-07-05 RX ADMIN — TETANUS TOXOID, REDUCED DIPHTHERIA TOXOID AND ACELLULAR PERTUSSIS VACCINE, ADSORBED 0.5 ML: 5; 2.5; 8; 8; 2.5 SUSPENSION INTRAMUSCULAR at 10:06

## 2018-07-05 NOTE — DISCHARGE INSTRUCTIONS
Fingertip Amputation: Care Instructions  Your Care Instructions  Fingertip amputation is a common injury. Treatment depends on how much skin, tissue, bone, and nail were damaged and how much of your finger or thumb was cut off. The doctor may have put stitches in your finger. You may need to see a hand surgeon for more treatment. Your fingertips have many nerves and are very sensitive, so the injury may be very painful. Recovery can take several weeks. Your finger may be sensitive to cold and painful for a year or more. You probably will have a splint to protect your finger as it heals. It is very important that you wear the splint exactly as your doctor tells you. Wearing a splint may interfere with your normal activities. Ask for help with daily tasks if you need it. The doctor has checked you carefully, but problems can develop later. If you notice any problems or new symptoms, get medical treatment right away. Follow-up care is a key part of your treatment and safety. Be sure to make and go to all appointments, and call your doctor if you are having problems. It's also a good idea to know your test results and keep a list of the medicines you take. How can you care for yourself at home? · If your doctor put a splint on your finger, wear the splint exactly as directed. Keep the splint dry. Do not remove it until your doctor says you can. · Keep the cut dry for the first 24 to 48 hours. After this, you can shower if your doctor says it is okay. Pat the cut dry. · Don't soak the cut, such as in a bathtub. Your doctor will tell you when it's safe to get the cut wet. · If your doctor told you how to care for your cut, follow your doctor's instructions. If you did not get instructions, follow this general advice:  ¨ After the first 24 to 48 hours, wash around the cut with clean water 2 times a day. Don't use hydrogen peroxide or alcohol, which can slow healing.   ¨ You may cover the cut with a thin layer of petroleum jelly, such as Vaseline, and a nonstick bandage. ¨ Apply more petroleum jelly and replace the bandage as needed. ¨ Prop up the sore hand on a pillow anytime you sit or lie down during the next 3 days. Try to keep it above the level of your heart. This will help reduce swelling. ¨ Avoid any activity that could cause your cut to reopen. ¨ Do not remove the stitches on your own. Your doctor will tell you when to come back to have the stitches removed. · Be safe with medicines. Take pain medicines exactly as directed. ¨ If the doctor gave you a prescription medicine for pain, take it as prescribed. ¨ If you are not taking a prescription pain medicine, ask your doctor if you can take an over-the-counter medicine. · If your doctor prescribed antibiotics, take them as directed. Do not stop taking them just because you feel better. You need to take the full course of antibiotics. When should you call for help? Call your doctor now or seek immediate medical care if:  ? · You have new pain, or your pain gets worse. ? · The skin near the wound is cool or pale or changes color. ? · The wound starts to bleed, and blood soaks through the bandage. Oozing small amounts of blood is normal.   ? · Your wound comes open. ? · You have symptoms of infection, such as:  ¨ Increased pain, swelling, warmth, or redness near the wound. ¨ Red streaks leading from the wound. ¨ Pus draining from the wound. ¨ A fever. ? Watch closely for changes in your health, and be sure to contact your doctor if:  ? · You do not get better as expected. Where can you learn more? Go to http://karl-ivan.info/. Enter [id number] [id number] in the search box to learn more about \"Fingertip Amputation: Care Instructions. \"  Current as of: March 21, 2017  Content Version: 11.4  © 7513-1422 [employer].  Care instructions adapted under license by [employer] (which disclaims liability or warranty for this information). If you have questions about a medical condition or this instruction, always ask your healthcare professional. [place] [address] any warranty or liability for your use of this information.

## 2018-07-05 NOTE — ED NOTES
Dr Reaz Contreras reviewed discharge instructions with the patient. The patient verbalized understanding. All questions and concerns were addressed. The patient declined a wheelchair and is discharged ambulatory in the care of family members with instructions and prescriptions in hand. Pt is alert and oriented x 4. Respirations are clear and unlabored.

## 2018-07-05 NOTE — ED PROVIDER NOTES
EMERGENCY DEPARTMENT HISTORY AND PHYSICAL EXAM      Date: 7/5/2018  Patient Name: Cindy Grover    History of Presenting Illness     Chief Complaint   Patient presents with    Laceration     Ambulatory with complaint of lac to tip of L index finger x this am       History Provided By: Patient    HPI: Cindy Grover, 62 y.o. female with PMHx significant for HTN and OA, presents ambulatory to the ED with cc of wound to her left second finger today. Pt reports onset at work [employer], after getting her left second finger caught in [employer] wire part\" of a machine. She reports a segment of the distal aspect of her finger was removed during injury, which she has since placed into a bag of ice. Pt denies taking any medications for her symptoms. She notes she is right hand dominant. She specifically denies any fevers, chills, nausea, vomiting, chest pain, shortness of breath, headache, rash, diarrhea, sweating or weight loss. There are no other complaints, changes, or physical findings at this time.     PCP: Jolena Ganser, MD        Past History     Past Medical History:  Past Medical History:   Diagnosis Date    DDD (degenerative disc disease)     HTN (hypertension) 12/15/2009    Obesity     Osteoarthritis     Superficial phlebitis     right arm       Past Surgical History:  Past Surgical History:   Procedure Laterality Date    HX CYST REMOVAL  02/2015    from jaw and back     HX CYST REMOVAL  02/2015    from finger    HX HYSTERECTOMY      partial    HX ORTHOPAEDIC      right thumb fracture repair       Family History:  Family History   Problem Relation Age of Onset    Cancer Mother      pancreatic    Osteoporosis Mother     Arthritis-osteo Mother     Hypertension Other     COPD Other     Other Other      pulmonary hypertension/Gilbert's disease    Osteoporosis Other     Thyroid Disease Other     Osteoporosis Maternal Grandmother     Cancer Maternal Grandfather      lung       Social History:  Social History   Substance Use Topics    Smoking status: Former Smoker     Quit date: 7/1/2008    Smokeless tobacco: Never Used      Comment: July 2008    Alcohol use 3.0 oz/week     6 Standard drinks or equivalent per week      Comment: social       Allergies:  No Known Allergies      Review of Systems   Review of Systems   Constitutional: Negative. Negative for activity change, appetite change, chills, fatigue, fever and unexpected weight change. HENT: Negative. Negative for congestion, hearing loss, rhinorrhea, sneezing and voice change. Eyes: Negative. Negative for pain and visual disturbance. Respiratory: Negative. Negative for apnea, cough, choking, chest tightness and shortness of breath. Cardiovascular: Negative. Negative for chest pain and palpitations. Gastrointestinal: Negative. Negative for abdominal distention, abdominal pain, blood in stool, diarrhea, nausea and vomiting. Genitourinary: Negative. Negative for difficulty urinating, flank pain, frequency and urgency. No discharge   Musculoskeletal: Negative. Negative for arthralgias, back pain, myalgias and neck stiffness. Skin: Positive for wound (left second finger). Negative for color change and rash. Neurological: Negative. Negative for dizziness, seizures, syncope, speech difficulty, weakness, numbness and headaches. Hematological: Negative for adenopathy. Psychiatric/Behavioral: Negative. Negative for agitation, behavioral problems, dysphoric mood and suicidal ideas. The patient is not nervous/anxious. Physical Exam   Physical Exam   Constitutional: She is oriented to person, place, and time. She appears well-developed and well-nourished. No distress. HENT:   Head: Normocephalic and atraumatic. Mouth/Throat: Oropharynx is clear and moist. No oropharyngeal exudate. Eyes: Conjunctivae and EOM are normal. Pupils are equal, round, and reactive to light. Right eye exhibits no discharge.  Left eye exhibits no discharge. Neck: Normal range of motion. Neck supple. Cardiovascular: Normal rate, regular rhythm and intact distal pulses. Exam reveals no gallop and no friction rub. No murmur heard. Pulmonary/Chest: Effort normal and breath sounds normal. No respiratory distress. She has no wheezes. She has no rales. She exhibits no tenderness. Abdominal: Soft. Bowel sounds are normal. She exhibits no distension and no mass. There is no tenderness. There is no rebound and no guarding. Musculoskeletal: Normal range of motion. She exhibits no edema. 5 cm fingertip avulsion to distal phalanx of second digit of left hand   Lymphadenopathy:     She has no cervical adenopathy. Neurological: She is alert and oriented to person, place, and time. No cranial nerve deficit. Coordination normal.   Skin: Skin is warm and dry. No rash noted. No erythema. Psychiatric: She has a normal mood and affect. Nursing note and vitals reviewed. Diagnostic Study Results     Radiologic Studies -   XR 2ND FINGER LT MIN 2 V   Final Result   EXAM:  XR 2ND FINGER LT MIN 2 V     INDICATION:   Trauma.     COMPARISON: None.     FINDINGS: Three views of the left  second finger demonstrate avulsion injury  tuft of the left second digit with small bony fragments noted in the soft  tissues. .. There are degenerative changes first metacarpal carpal joint. .     IMPRESSION  IMPRESSION:   There is an avulsion injury tuft of the left second digit. Medical Decision Making   I am the first provider for this patient. I reviewed the vital signs, available nursing notes, past medical history, past surgical history, family history and social history. Vital Signs-Reviewed the patient's vital signs.   Patient Vitals for the past 12 hrs:   Temp Pulse Resp BP SpO2   07/05/18 1100 - - - 132/83 92 %   07/05/18 1030 - - - 149/85 92 %   07/05/18 1000 - - - 137/79 97 %   07/05/18 0856 98.2 °F (36.8 °C) 94 24 (!) 185/100 97 %       Pulse Oximetry Analysis - 97% on RA    Records Reviewed: Nursing Notes and Old Medical Records    Provider Notes (Medical Decision Making):     DDx: laceration, partial amputation, avulsion    ED Course:   Initial assessment performed. The patients presenting problems have been discussed, and they are in agreement with the care plan formulated and outlined with them. I have encouraged them to ask questions as they arise throughout their visit. Procedure Note - Digital Block:   9:19 AM   Performed by: Woodrow Bel  5 mL's of 50/50 2% lidocaine without epinephrine and 0.5 % marcaine without epinephrine used to perform digital block of Left index finger(s). The procedure took 1-15 minutes, and pt tolerated well. Consult Note:  10:12 AM  Paul Panchal MD spoke with Gary Coulter MD; SHANNON Kelly  Specialty: Orthopedics  Discussed pts hx, disposition, and available diagnostic and imaging results. Reviewed care plans. Consultant agrees with plans as outlined. Plan for rongeur and repair, follow up in office with Suly Estrada MD tomorrow. Procedure Note - Fingertip Amputation Repair:  12:13 PM  Procedure by Elsi Mcnally MD.  Complexity: Complex - fingertip avulsion  5 cm fingertip avulsion to distal phalanx of second digit of left hand was irrigated copiously with NS under jet lavage, prepped with Betadine and draped in a sterile fashion. The area was anesthetized with 2 cc's of 0.5% marcaine and 2 cc's of 1% lidocaine without epinephrine via digital block. 0.5 cm of bone was rongeured off. The wound was explored in a bloodless field using a finger tourniquet with the following results: Bone and nail fragments; no tendon laceration seen; in continued sterile fashion, the wound was then washed extensively with saline and betadine. Debridement of foreign bodies mentioned above was performed, with associated wound edge debridement.  The wound was repaired with One layer suture closure: Skin Layer:  10 sutures placed, stitch type:simple interrupted, suture: 4-0 nylon. .  The wound was closed with good hemostasis and approximation. Sterile dressing applied. Estimated blood loss: 2 cc's  The procedure took 16-30 minutes, and pt tolerated well. 12:45 PM  Pt given Tdap prior to discharge. Disposition:  12:48 PM  Vinita Mendoza's  results have been reviewed with her. She has been counseled regarding her diagnosis. She verbally conveys understanding and agreement of the signs, symptoms, diagnosis, treatment and prognosis and additionally agrees to follow up as recommended with Dr. Higinio Macias MD in 24 - 48 hours. She also agrees with the care-plan and conveys that all of her questions have been answered. I have also put together some discharge instructions for her that include: 1) educational information regarding their diagnosis, 2) how to care for their diagnosis at home, as well a 3) list of reasons why they would want to return to the ED prior to their follow-up appointment, should their condition change. PLAN:  1. Current Discharge Medication List      START taking these medications    Details   HYDROcodone-acetaminophen (LORTAB 7.5-325) 7.5-325 mg per tablet Take 1 Tab by mouth every eight (8) hours as needed for Pain. Max Daily Amount: 3 Tabs. Indications: Pain  Qty: 10 Tab, Refills: 0    Associated Diagnoses: Amputation of finger tip, initial encounter           2. Follow-up Information     Follow up With Details Comments Contact Info    Lizzette Miller MD Call in 1 day for an appt to be seen on monday for wound check [facility] [address]  [facility]  [phone]          Return to ED if worse     Diagnosis     Clinical Impression:   1. Amputation of finger tip, initial encounter        Attestations:     This note is prepared by Linda Boone, acting as Scribe for Clotilde Moulton MD.    Clotilde Moulton MD: The scribe's documentation has been prepared under my direction and personally reviewed by me in its entirety. I confirm that the note above accurately reflects all work, treatment, procedures, and medical decision making performed by me.

## 2018-08-06 RX ORDER — HYDROCHLOROTHIAZIDE 25 MG/1
TABLET ORAL
Qty: 30 TAB | Refills: 0 | Status: SHIPPED | OUTPATIENT
Start: 2018-08-06 | End: 2018-08-13 | Stop reason: SDUPTHER

## 2018-08-06 RX ORDER — LOSARTAN POTASSIUM 100 MG/1
100 TABLET ORAL DAILY
Qty: 30 TAB | Refills: 0 | Status: SHIPPED | OUTPATIENT
Start: 2018-08-06 | End: 2018-08-13

## 2018-08-06 NOTE — TELEPHONE ENCOUNTER
----- Message from Katie Gonzalez sent at 8/3/2018-----  Regarding: Dr. Enoc Rodriguez  Patient is requesting a refill on her two Rx's Hydrochlorothiazide 25 mg and Losartan Potassium 100 mg to be called into her pharmacy.  Patient's best contact number is [phone]    Pharmacy:  [address] [facility] [place] [id number] [facility], [address] [place] [address], [address] [facility]  [phone]             Message copied/pasted from [place]

## 2018-08-06 NOTE — TELEPHONE ENCOUNTER
Spoke with patient. Two pt identifiers confirmed. Patient scheduled for a followup with Dr. Alicia Cuevas on 08/13/18. Will pend refills to Dr. Alicia Cuevas for approval.  Pt verbalized understanding of information discussed w/ no further questions at this time.

## 2018-08-06 NOTE — TELEPHONE ENCOUNTER
Patient states she's returning your call. Patient states she's at work until 3:30 pm. Patient states a detailed message can be left on attached phone number voice mail. Please call.  Thank you

## 2018-08-13 ENCOUNTER — OFFICE VISIT (OUTPATIENT)
Dept: INTERNAL MEDICINE CLINIC | Age: 58
End: 2018-08-13

## 2018-08-13 VITALS
RESPIRATION RATE: 16 BRPM | OXYGEN SATURATION: 98 % | SYSTOLIC BLOOD PRESSURE: 119 MMHG | BODY MASS INDEX: 29.25 KG/M2 | WEIGHT: 182 LBS | DIASTOLIC BLOOD PRESSURE: 76 MMHG | HEART RATE: 79 BPM | TEMPERATURE: 98.1 F | HEIGHT: 66 IN

## 2018-08-13 DIAGNOSIS — E66.3 OVERWEIGHT: ICD-10-CM

## 2018-08-13 DIAGNOSIS — Z00.00 PHYSICAL EXAM, ANNUAL: Primary | ICD-10-CM

## 2018-08-13 DIAGNOSIS — I10 ESSENTIAL HYPERTENSION: ICD-10-CM

## 2018-08-13 DIAGNOSIS — R73.01 IFG (IMPAIRED FASTING GLUCOSE): ICD-10-CM

## 2018-08-13 RX ORDER — LOSARTAN POTASSIUM 100 MG/1
100 TABLET ORAL DAILY
Qty: 30 TAB | Refills: 6 | Status: SHIPPED | OUTPATIENT
Start: 2018-08-13 | End: 2019-03-31 | Stop reason: SDUPTHER

## 2018-08-13 RX ORDER — HYDROCHLOROTHIAZIDE 25 MG/1
TABLET ORAL
Qty: 30 TAB | Refills: 6 | Status: SHIPPED | OUTPATIENT
Start: 2018-08-13 | End: 2019-03-31 | Stop reason: SDUPTHER

## 2018-08-13 NOTE — MR AVS SNAPSHOT
[address] 
[phone] Patient: Josh Acosta MRN: [id number] Visit Information Date & Time Provider Department Dept. Phone Encounter #  
 8/13/2018  2:00 PM Blanca Wilder, [address] [phone] [id number] Follow-up Instructions Return in about 6 months (around 2/13/2019). Upcoming Health Maintenance Date Due Influenza Age 5 to Adult 7/8/2019* PAP AKA CERVICAL CYTOLOGY 1/23/2020 BREAST CANCER SCRN MAMMOGRAM 1/24/2020 COLONOSCOPY 9/11/2024 DTaP/Tdap/Td series (2 - Td) 7/5/2028 *Topic was postponed. The date shown is not the original due date. Allergies as of 8/13/2018  Review Complete On: 8/13/2018 By: James Patel LPN No Known Allergies Current Immunizations  Reviewed on 7/24/2017 Name Date Tdap 7/5/2018 10:06 AM  
  
 Not reviewed this visit You Were Diagnosed With   
  
 Codes Comments Physical exam, annual    -  Primary ICD-10-CM: Z00.00 ICD-9-CM: V70.0 Essential hypertension     ICD-10-CM: I10 
ICD-9-CM: 401.9 Overweight     ICD-10-CM: B77.0 ICD-9-CM: 278.02   
 IFG (impaired fasting glucose)     ICD-10-CM: R73.01 
ICD-9-CM: 790.21 Vitals BP Pulse Temp Resp Height(growth percentile) Weight(growth percentile) 119/76 (BP 1 Location: Left arm, BP Patient Position: Sitting) 79 98.1 °F (36.7 °C) (Oral) 16 5' 6\" (1.676 m) 182 lb (82.6 kg) SpO2 BMI OB Status Smoking Status 98% 29.38 kg/m2 Postmenopausal Former Smoker Vitals History BMI and BSA Data Body Mass Index Body Surface Area  
 29.38 kg/m 2 1.96 m 2 Preferred Pharmacy Pharmacy Name Phone [id number] [place], [address] [phone] Your Updated Medication List  
  
   
 This list is accurate as of 8/13/18  2:28 PM.  Always use your most recent med list.  
  
  
  
  
 gabapentin 100 mg capsule Commonly known as:  NEURONTIN Take 300 mg by mouth daily. Indications: TAKE 2 TABS EVERY MORNING  
  
 hydroCHLOROthiazide 25 mg tablet Commonly known as:  HYDRODIURIL  
TAKE 1 TAB BY MOUTH DAILY. losartan 100 mg tablet Commonly known as:  COZAAR Take 1 Tab by mouth daily. MOBIC 15 mg tablet Generic drug:  meloxicam  
Take 15 mg by mouth daily. ONE-A-DAY 50 PLUS PO Take  by mouth. VITAMIN B-12 PO Take  by mouth. Prescriptions Sent to Pharmacy Refills  
 hydroCHLOROthiazide (HYDRODIURIL) 25 mg tablet 6 Sig: TAKE 1 TAB BY MOUTH DAILY. Class: Normal  
 Pharmacy: [id number]- [address] Ph #: [phone]  
 losartan (COZAAR) 100 mg tablet 6 Sig: Take 1 Tab by mouth daily. Class: Normal  
 Pharmacy: [facility][id number]- [address] Ph #: [phone] Route: Oral  
  
We Performed the Following HEMOGLOBIN A1C WITH EAG [53205 CPT(R)] METABOLIC PANEL, BASIC [64634 CPT(R)] Follow-up Instructions Return in about 6 months (around 2/13/2019). Introducing [facility]! Dear Abraham Del Toro: Thank you for requesting a [employer] account. Our records indicate that you already have an active [employer] account. You can access your account anytime at [email or web address] Did you know that you can access your hospital and ER discharge instructions at any time in [employer]? You can also review all of your test results from your hospital stay or ER visit. Additional Information If you have questions, please visit the Frequently Asked Questions section of the [employer] website at [email or web address] Remember, MyChart is NOT to be used for urgent needs. For medical emergencies, dial 911. Now available from your iPhone and Android! Please provide this summary of care documentation to your next provider. Your primary care clinician is listed as Margaretmary Saint. If you have any questions after today's visit, please call [phone].

## 2018-08-13 NOTE — PROGRESS NOTES
HISTORY OF PRESENT ILLNESS  Chaitanya Haynes is a 62 y.o. female. HPI  Last here 18. Pt is here to f/u on chronic conditions.      BP is 119/76  She is not monitoring her BP at home  Continues losartan 100mg daily   Lov, ordered HCTZ - reports compliance and is taking both      Wt is down 8 lbs since [employer] pt on this feat  Pt states she has been cutting out pasta  Discussed continued diet and weight loss       Reviewed last labs   Will get labs today      Continues vit D OTC 1000U daily   She also takes centrum silver multi-vitamin daily      Pt has been taking another \"menopause tablet from [employer]", which improves her hot flashes  Recall pt tried and failed effexor 37.5mg and 75mg daily for hot flashes   Not wanting new medication     Pt follows with Dr. Jeanette rUbina (ortho) for neck pain  Reviewed notes 17  Pt also follows with Dr. Nando Diallo (ortho) for neck pain  Last visit was 8/3/17   Pt received injections at that time  Pt has been taking gabapentin 2 tabs in the AM for her neck pain, which improves her sx  Discussed gabapentin also helping with her hot flashes    Reviewed depression screen 18: +3  Her 's company was sold  Pt lost the tip of her index finger at work on 18  Pt went to the ER for this  Pt saw Dr Emilie Rowe (ortho), no longer needs to see this physician  Pt is still in the healing process  Pt is only taking aleve or tylenol for her pain  Pt is not interested in medications at this time    ACP not on file. SDM is her .   Provided information today.       PREVENTIVE:    Colonoscopy: , Dr. Lolis Samayoa, repeat 10 years  Pap: Dr. Suhas Marion, , due, referred  Mammogram: [facility] ~  DEXA: 8/10/16, nl, will repeat 3-4 years  Tdap: declines  Pneumovax: not yet needed  Mafehir17: not yet needed  Shingrix: not yet needed  Flu shot: declines    Eye exam: 12/16/15, every other year, due  EK/16, normal sinus rhythm   Hep C screen: , negative  A1C: 7/17 5.5, 1/18 5.5  Lipids: 1/18     Patient Active Problem List    Diagnosis Date Noted    Urgency of urination 09/06/2013    Nocturia 09/06/2013    Mixed incontinence 09/06/2013    Cystocele, lateral 09/06/2013    Rectocele 09/06/2013    Postmenopausal atrophic vaginitis 09/06/2013    Obesity 02/12/2013    Infected sebaceous cyst Left post auricular. 08/24/2011    HTN (hypertension) 12/15/2009    Chronic neck pain 12/15/2009    DJD (degenerative joint disease) 12/15/2009    Vitamin D deficiency 12/15/2009    Fibrocystic disease of breast 12/15/2009     Current Outpatient Prescriptions   Medication Sig Dispense Refill    cyanocobalamin, vitamin B-12, (VITAMIN B-12 PO) Take  by mouth.  multivitamin with minerals (ONE-A-DAY 50 PLUS PO) Take  by mouth.  hydroCHLOROthiazide (HYDRODIURIL) 25 mg tablet TAKE 1 TAB BY MOUTH DAILY. 30 Tab 0    losartan (COZAAR) 100 mg tablet Take 1 Tab by mouth daily. 30 Tab 0    losartan (COZAAR) 100 mg tablet MUST USE MAIL ORDERTAKE 1 TAB BY MOUTH DAILY. 30 Tab 0    gabapentin (NEURONTIN) 100 mg capsule Take 300 mg by mouth daily. Indications: TAKE 2 TABS EVERY MORNING      meloxicam (MOBIC) 15 mg tablet Take 15 mg by mouth daily.  HYDROcodone-acetaminophen (LORTAB 7.5-325) 7.5-325 mg per tablet Take 1 Tab by mouth every eight (8) hours as needed for Pain. Max Daily Amount: 3 Tabs.  Indications: Pain 10 Tab 0     Past Surgical History:   Procedure Laterality Date    HX AMPUTATION FINGER      left index    HX CYST REMOVAL  02/2015    from jaw and back     HX CYST REMOVAL  02/2015    from finger    HX HYSTERECTOMY      partial    HX ORTHOPAEDIC      right thumb fracture repair      Lab Results  Component Value Date/Time   WBC 5.1 07/24/2017 08:45 AM   HGB 13.5 07/24/2017 08:45 AM   HCT 40.5 07/24/2017 08:45 AM   PLATELET 974 85/40/4087 08:45 AM   MCV 94 07/24/2017 08:45 AM     Lab Results  Component Value Date/Time   Cholesterol, total 211 (H) 01/24/2018 08:54 AM   HDL Cholesterol 76 01/24/2018 08:54 AM   LDL, calculated 109 (H) 01/24/2018 08:54 AM   Triglyceride 130 01/24/2018 08:54 AM     Lab Results  Component Value Date/Time   GFR est non-AA 96 01/24/2018 08:54 AM   GFR est  01/24/2018 08:54 AM   Creatinine 0.70 01/24/2018 08:54 AM   BUN 11 01/24/2018 08:54 AM   Sodium 140 01/24/2018 08:54 AM   Potassium 4.4 01/24/2018 08:54 AM   Chloride 100 01/24/2018 08:54 AM   CO2 24 01/24/2018 08:54 AM        Review of Systems   Respiratory: Negative for shortness of breath. Cardiovascular: Negative for chest pain. Physical Exam   Constitutional: She is oriented to person, place, and time. She appears well-developed and well-nourished. No distress. HENT:   Head: Normocephalic and atraumatic. Right Ear: External ear normal.   Left Ear: External ear normal.   Mouth/Throat: Oropharynx is clear and moist. No oropharyngeal exudate. Eyes: Conjunctivae and EOM are normal. Pupils are equal, round, and reactive to light. Right eye exhibits no discharge. Left eye exhibits no discharge. No scleral icterus. Neck: Normal range of motion. Neck supple. No carotid bruits    Cardiovascular: Normal rate, regular rhythm, normal heart sounds and intact distal pulses. Exam reveals no gallop and no friction rub. No murmur heard. Pulmonary/Chest: Effort normal and breath sounds normal. No respiratory distress. She has no wheezes. She has no rales. She exhibits no tenderness. Abdominal: Soft. She exhibits no distension and no mass. There is no tenderness. There is no rebound and no guarding. Musculoskeletal: Normal range of motion. She exhibits no edema, tenderness or deformity. L hand amputation of distal portion of index finger   Lymphadenopathy:     She has no cervical adenopathy. Neurological: She is alert and oriented to person, place, and time. Coordination normal.   Skin: Skin is warm and dry. No rash noted. She is not diaphoretic.  No erythema. No pallor. Psychiatric: She has a normal mood and affect. Her behavior is normal.       ASSESSMENT and PLAN    ICD-10-CM ICD-9-CM    1. Physical exam, annual    Discussed diet and w/l, improved from lov, mammo and pelvic completed at [facility], will get note for review, declines flu shot, COLO UTD, eye exam due, labs ordered   Z00.00 V70.0    2. Essential hypertension    Controlled on losartan and HCTZ   R66 712.7 METABOLIC PANEL, BASIC      HEMOGLOBIN A1C WITH EAG   3. Overweight    Wt improved from lov, continue to work on w/l   V55.0 288.15 METABOLIC PANEL, BASIC      HEMOGLOBIN A1C WITH EAG   4. IFG (impaired fasting glucose)    Check a1c   S21.92 199.90 METABOLIC PANEL, BASIC      HEMOGLOBIN A1C WITH EAG      Depression screen reviewed and positive (3). Pt has been dealing with acute stressors including a partial finger amputation. Pt is not interested in meds at this time. Scribed by Ernestine Negron of [address] [place] [address] [address], as dictated by Dr. Domenico David. Current diagnosis and concerns discussed with pt at length. Pt understands risks and benefits or current treatment plan and medications, and accepts the treatment and medication with any possible risks. Pt asks appropriate questions, which were answered. Pt was instructed to call with any concerns or problems. This note will not be viewable in [address]

## 2018-08-14 LAB
BUN SERPL-MCNC: 20 MG/DL (ref 6–24)
BUN/CREAT SERPL: 20 (ref 9–23)
CALCIUM SERPL-MCNC: 10.1 MG/DL (ref 8.7–10.2)
CHLORIDE SERPL-SCNC: 97 MMOL/L (ref 96–106)
CO2 SERPL-SCNC: 26 MMOL/L (ref 20–29)
CREAT SERPL-MCNC: 1 MG/DL (ref 0.57–1)
EST. AVERAGE GLUCOSE BLD GHB EST-MCNC: 120 MG/DL
GLUCOSE SERPL-MCNC: 134 MG/DL (ref 65–99)
HBA1C MFR BLD: 5.8 % (ref 4.8–5.6)
POTASSIUM SERPL-SCNC: 3.8 MMOL/L (ref 3.5–5.2)
SODIUM SERPL-SCNC: 136 MMOL/L (ref 134–144)

## 2019-02-13 ENCOUNTER — OFFICE VISIT (OUTPATIENT)
Dept: INTERNAL MEDICINE CLINIC | Age: 59
End: 2019-02-13

## 2019-02-13 VITALS
WEIGHT: 187 LBS | HEIGHT: 66 IN | HEART RATE: 60 BPM | SYSTOLIC BLOOD PRESSURE: 128 MMHG | DIASTOLIC BLOOD PRESSURE: 81 MMHG | BODY MASS INDEX: 30.05 KG/M2 | OXYGEN SATURATION: 98 % | TEMPERATURE: 97.2 F | RESPIRATION RATE: 16 BRPM

## 2019-02-13 DIAGNOSIS — R73.01 IFG (IMPAIRED FASTING GLUCOSE): ICD-10-CM

## 2019-02-13 DIAGNOSIS — M54.2 CHRONIC NECK PAIN: ICD-10-CM

## 2019-02-13 DIAGNOSIS — E66.9 OBESITY (BMI 30.0-34.9): ICD-10-CM

## 2019-02-13 DIAGNOSIS — E55.9 VITAMIN D DEFICIENCY: ICD-10-CM

## 2019-02-13 DIAGNOSIS — I10 ESSENTIAL HYPERTENSION: Primary | ICD-10-CM

## 2019-02-13 DIAGNOSIS — Z12.4 CERVICAL CANCER SCREENING: ICD-10-CM

## 2019-02-13 DIAGNOSIS — L21.9 SEBORRHEIC DERMATITIS: ICD-10-CM

## 2019-02-13 DIAGNOSIS — Z12.39 BREAST SCREENING: ICD-10-CM

## 2019-02-13 DIAGNOSIS — E78.5 DYSLIPIDEMIA: ICD-10-CM

## 2019-02-13 DIAGNOSIS — G89.29 CHRONIC NECK PAIN: ICD-10-CM

## 2019-02-13 RX ORDER — GABAPENTIN 100 MG/1
300 CAPSULE ORAL DAILY
Qty: 60 CAP | Refills: 4 | Status: SHIPPED | OUTPATIENT
Start: 2019-02-13 | End: 2019-08-13

## 2019-02-13 RX ORDER — KETOCONAZOLE 20 MG/G
CREAM TOPICAL DAILY
Qty: 30 G | Refills: 0 | Status: SHIPPED | OUTPATIENT
Start: 2019-02-13 | End: 2022-06-06

## 2019-02-13 NOTE — PROGRESS NOTES
HISTORY OF PRESENT ILLNESS  Juan Chin is a 62 y.o. female. HPI  Last here 8/13/18. Pt is here to f/u on chronic conditions.      BP is 128/81  She is not monitoring her BP at home  Continues losartan 100mg daily and HCTZ 25mg     Wt today is 187 lbs --up 5 lbs x lov   Pt states she is watching what she eats  Pt drinks mainly water  Discussed [employer]  Pt has been considering a keto diet  Discussed continued diet and weight loss       Reviewed labs 8/18     Pt is not taking vit D currently, just a multivitamin  Advised her to take vit D daily     Recall pt tried and failed effexor 37.5mg and 75mg daily for hot flashes   Not wanting new medication, thinks this has been improving hot flashes mostly resolved     Pt follows with Dr. Carri Mendosa (ortho) for neck pain  Reviewed notes 8/17/17  Pt also follows with Dr. Fely Lopez (ortho) for neck pain  Last visit was 8/3/17   Pt received injections at that time  Pt was been taking gabapentin 2 tabs in the AM for her neck pain but ran out  She ran out of this and would like more as this worked well has had more pain recently - refilled  Provided referral to see Dr Carri Mendosa again    Reviewed depression screen 02/13/19: +3  Pt is just down from her joint pain  Denies being depressed or needing medication    Pt c/o her forehead being itchy and red x a few months  Not improved  Pt tried applying cortisone cream and moisturizer but these did not help  Will provide ketoconazole cream  Provided info for derm if not improving    Reviewed mammo 2/18: nl     ACP not on file. SDM is her .   Provided information in the past.       PREVENTIVE:    Colonoscopy: 9/14, Dr. Alyssa Tovar, repeat 10 years  Pap: [facility], 2/18  Mammogram: [facility], 2/27/18, negative--due  DEXA: 8/10/16, nl, will repeat 3-4 years  Tdap: declines  Pneumovax: not yet needed  [id number]: not yet needed  Shingrix: not yet needed  Flu shot: declines    Eye exam: 10/18  A1C: 7/17 5.5, 1/18 5.5, 8/18 5.8  Hep C screen: , negative  Lipids:    EK/16, nsr    Patient Active Problem List    Diagnosis Date Noted    Urgency of urination 2013    Nocturia 2013    Mixed incontinence 2013    Cystocele, lateral 2013    Rectocele 2013    Postmenopausal atrophic vaginitis 2013    Obesity 2013    Infected sebaceous cyst Left post auricular. 2011    HTN (hypertension) 12/15/2009    Chronic neck pain 12/15/2009    DJD (degenerative joint disease) 12/15/2009    Vitamin D deficiency 12/15/2009    Fibrocystic disease of breast 12/15/2009     Current Outpatient Medications   Medication Sig Dispense Refill    multivitamin with minerals (ONE-A-DAY 50 PLUS PO) Take  by mouth.  hydroCHLOROthiazide (HYDRODIURIL) 25 mg tablet TAKE 1 TAB BY MOUTH DAILY. 30 Tab 6    losartan (COZAAR) 100 mg tablet Take 1 Tab by mouth daily. 30 Tab 6    cyanocobalamin, vitamin B-12, (VITAMIN B-12 PO) Take  by mouth.  gabapentin (NEURONTIN) 100 mg capsule Take 300 mg by mouth daily. Indications: TAKE 2 TABS EVERY MORNING      meloxicam (MOBIC) 15 mg tablet Take 15 mg by mouth daily.        Past Surgical History:   Procedure Laterality Date    HX AMPUTATION FINGER      left index    HX CYST REMOVAL  2015    from jaw and back     HX CYST REMOVAL  2015    from finger    HX HYSTERECTOMY      partial    HX ORTHOPAEDIC      right thumb fracture repair      Lab Results   Component Value Date/Time    WBC 5.1 2017 08:45 AM    HGB 13.5 2017 08:45 AM    HCT 40.5 2017 08:45 AM    PLATELET 236  08:45 AM    MCV 94 2017 08:45 AM     Lab Results   Component Value Date/Time    Cholesterol, total 211 (H) 2018 08:54 AM    HDL Cholesterol 76 2018 08:54 AM    LDL, calculated 109 (H) 2018 08:54 AM    Triglyceride 130 2018 08:54 AM     Lab Results   Component Value Date/Time    GFR est non-AA 62 2018 02:47 PM    GFR est AA 72 08/13/2018 02:47 PM    Creatinine 1.00 08/13/2018 02:47 PM    BUN 20 08/13/2018 02:47 PM    Sodium 136 08/13/2018 02:47 PM    Potassium 3.8 08/13/2018 02:47 PM    Chloride 97 08/13/2018 02:47 PM    CO2 26 08/13/2018 02:47 PM        Review of Systems   Respiratory: Negative for shortness of breath. Cardiovascular: Negative for chest pain. Skin: Positive for itching. Physical Exam   Constitutional: She is oriented to person, place, and time. She appears well-developed and well-nourished. No distress. HENT:   Head: Normocephalic and atraumatic. Mouth/Throat: Oropharynx is clear and moist. No oropharyngeal exudate. Eyes: Conjunctivae and EOM are normal. Right eye exhibits no discharge. Left eye exhibits no discharge. Neck: Normal range of motion. Neck supple. Cardiovascular: Normal rate, regular rhythm and normal heart sounds. Exam reveals no gallop and no friction rub. No murmur heard. Pulmonary/Chest: Effort normal and breath sounds normal. No respiratory distress. She has no wheezes. She has no rales. She exhibits no tenderness. Musculoskeletal: Normal range of motion. She exhibits no edema, tenderness or deformity. Lymphadenopathy:     She has no cervical adenopathy. Neurological: She is alert and oriented to person, place, and time. Coordination normal.   Skin: Skin is warm and dry. No rash noted. She is not diaphoretic. No erythema. No pallor. Mild erythema to forehead   Psychiatric: She has a normal mood and affect. Her behavior is normal.       ASSESSMENT and PLAN    ICD-10-CM ICD-9-CM    1. Essential hypertension    Controlled on losartan and HCTZ, continue no change to dose   I10 401.9 LIPID PANEL      METABOLIC PANEL, COMPREHENSIVE      CBC W/O DIFF      TSH 3RD GENERATION      HEMOGLOBIN A1C WITH EAG      VITAMIN D, 25 HYDROXY   2.  IFG (impaired fasting glucose)    Check a1c, discussed diet and w/l for tx   R73.01 790.21 LIPID PANEL      METABOLIC PANEL, COMPREHENSIVE      CBC W/O DIFF      TSH 3RD GENERATION      HEMOGLOBIN A1C WITH EAG      VITAMIN D, 25 HYDROXY   3. Obesity (BMI 30.0-34. 9)    Discussed need for w/l, she is thinking about doing keto diet, discussed [employer] as well   E66.9 278.00 LIPID PANEL      METABOLIC PANEL, COMPREHENSIVE      CBC W/O DIFF      TSH 3RD GENERATION      HEMOGLOBIN A1C WITH EAG      VITAMIN D, 25 HYDROXY   4. Vitamin D deficiency    Restart OTC vit D, check level and treat prn   E55.9 268.9 LIPID PANEL      METABOLIC PANEL, COMPREHENSIVE      CBC W/O DIFF      TSH 3RD GENERATION      HEMOGLOBIN A1C WITH EAG      VITAMIN D, 25 HYDROXY   5. Chronic neck pain    Restart gabapentin 200mg qhs, provide referral back to see Dr Jony Castle   M54.2 723.1 REFERRAL TO ORTHODONTICS    G89.29 338.29 LIPID PANEL      METABOLIC PANEL, COMPREHENSIVE      CBC W/O DIFF      TSH 3RD GENERATION      HEMOGLOBIN A1C WITH EAG      VITAMIN D, 25 HYDROXY   6. Dyslipidemia    Diet controlled, check lipids   E78.5 272.4 LIPID PANEL      METABOLIC PANEL, COMPREHENSIVE      CBC W/O DIFF      TSH 3RD GENERATION      HEMOGLOBIN A1C WITH EAG      VITAMIN D, 25 HYDROXY   7. Cervical cancer screening    Screen   Z12.4 V76.2 REFERRAL TO OBSTETRICS AND GYNECOLOGY      LIPID PANEL      METABOLIC PANEL, COMPREHENSIVE      CBC W/O DIFF      TSH 3RD GENERATION      HEMOGLOBIN A1C WITH EAG      VITAMIN D, 25 HYDROXY   8. Breast screening    Screen   Z12.31 V76.10 TORIE MAMMO BI SCREENING INCL CAD      LIPID PANEL      METABOLIC PANEL, COMPREHENSIVE      CBC W/O DIFF      TSH 3RD GENERATION      HEMOGLOBIN A1C WITH EAG      VITAMIN D, 25 HYDROXY   9. Seborrheic dermatitis    Will give ketoconazole cream, if not improving discussed seeing derm next as she has had this irritation for several months   L21.9 690.10 REFERRAL TO DERMATOLOGY        Depression screen reviewed and positive (3). Pt is just down from pain, denies being depressed.     Scribed by Ashok Ibanez of [address] [place] [address] [address], as dictated by Dr. Ayleen Whiteside Fabian.    Current diagnosis and concerns discussed with pt at length. Pt understands risks and benefits or current treatment plan and medications, and accepts the treatment and medication with any possible risks. Pt asks appropriate questions, which were answered. Pt was instructed to call with any concerns or problems. I have reviewed the note documented by the scribe. The services provided are my own.   The documentation is accurate

## 2019-02-14 DIAGNOSIS — E83.52 CALCIUM BLOOD INCREASED: ICD-10-CM

## 2019-02-14 DIAGNOSIS — I10 ESSENTIAL HYPERTENSION: Primary | ICD-10-CM

## 2019-02-14 LAB
25(OH)D3+25(OH)D2 SERPL-MCNC: 38.2 NG/ML (ref 30–100)
ALBUMIN SERPL-MCNC: 4.5 G/DL (ref 3.5–5.5)
ALBUMIN/GLOB SERPL: 1.5 {RATIO} (ref 1.2–2.2)
ALP SERPL-CCNC: 90 IU/L (ref 39–117)
ALT SERPL-CCNC: 25 IU/L (ref 0–32)
AST SERPL-CCNC: 19 IU/L (ref 0–40)
BILIRUB SERPL-MCNC: 0.5 MG/DL (ref 0–1.2)
BUN SERPL-MCNC: 16 MG/DL (ref 6–24)
BUN/CREAT SERPL: 18 (ref 9–23)
CALCIUM SERPL-MCNC: 10.7 MG/DL (ref 8.7–10.2)
CHLORIDE SERPL-SCNC: 99 MMOL/L (ref 96–106)
CHOLEST SERPL-MCNC: 199 MG/DL (ref 100–199)
CO2 SERPL-SCNC: 25 MMOL/L (ref 20–29)
CREAT SERPL-MCNC: 0.9 MG/DL (ref 0.57–1)
ERYTHROCYTE [DISTWIDTH] IN BLOOD BY AUTOMATED COUNT: 13.2 % (ref 12.3–15.4)
EST. AVERAGE GLUCOSE BLD GHB EST-MCNC: 114 MG/DL
GLOBULIN SER CALC-MCNC: 3 G/DL (ref 1.5–4.5)
GLUCOSE SERPL-MCNC: 102 MG/DL (ref 65–99)
HBA1C MFR BLD: 5.6 % (ref 4.8–5.6)
HCT VFR BLD AUTO: 40.4 % (ref 34–46.6)
HDLC SERPL-MCNC: 63 MG/DL
HGB BLD-MCNC: 13.4 G/DL (ref 11.1–15.9)
LDLC SERPL CALC-MCNC: 105 MG/DL (ref 0–99)
MCH RBC QN AUTO: 30.9 PG (ref 26.6–33)
MCHC RBC AUTO-ENTMCNC: 33.2 G/DL (ref 31.5–35.7)
MCV RBC AUTO: 93 FL (ref 79–97)
PLATELET # BLD AUTO: 282 X10E3/UL (ref 150–379)
POTASSIUM SERPL-SCNC: 4.5 MMOL/L (ref 3.5–5.2)
PROT SERPL-MCNC: 7.5 G/DL (ref 6–8.5)
RBC # BLD AUTO: 4.33 X10E6/UL (ref 3.77–5.28)
SODIUM SERPL-SCNC: 141 MMOL/L (ref 134–144)
TRIGL SERPL-MCNC: 154 MG/DL (ref 0–149)
TSH SERPL DL<=0.005 MIU/L-ACNC: 2.28 UIU/ML (ref 0.45–4.5)
VLDLC SERPL CALC-MCNC: 31 MG/DL (ref 5–40)
WBC # BLD AUTO: 4.4 X10E3/UL (ref 3.4–10.8)

## 2019-02-14 NOTE — PROGRESS NOTES
Called, spoke to pt. Two pt identifiers confirmed. Pt informed per Dr. Teo Shelley blood work shows mild calcium elevation. Pt informed per Dr. Teo Shelley Avoid extra otc calcium. Pt informed per Dr. Teo Shelley to repeat bmp and pth in 2-3 weeks. Pt informed per Dr. Teo Shelley rest of labs fine. Pt verbalized understanding of information discussed w/ no further questions at this time.

## 2019-03-01 DIAGNOSIS — E83.52 CALCIUM BLOOD INCREASED: ICD-10-CM

## 2019-03-01 DIAGNOSIS — E83.52 SERUM CALCIUM ELEVATED: Primary | ICD-10-CM

## 2019-03-01 DIAGNOSIS — I10 ESSENTIAL HYPERTENSION: ICD-10-CM

## 2019-03-02 LAB
BUN SERPL-MCNC: 35 MG/DL (ref 6–24)
BUN/CREAT SERPL: 36 (ref 9–23)
CALCIUM SERPL-MCNC: 10.1 MG/DL (ref 8.7–10.2)
CHLORIDE SERPL-SCNC: 97 MMOL/L (ref 96–106)
CO2 SERPL-SCNC: 27 MMOL/L (ref 20–29)
CREAT SERPL-MCNC: 0.96 MG/DL (ref 0.57–1)
GLUCOSE SERPL-MCNC: 94 MG/DL (ref 65–99)
POTASSIUM SERPL-SCNC: 4.4 MMOL/L (ref 3.5–5.2)
PTH-INTACT SERPL-MCNC: 28 PG/ML (ref 15–65)
SODIUM SERPL-SCNC: 138 MMOL/L (ref 134–144)

## 2019-03-06 ENCOUNTER — TELEPHONE (OUTPATIENT)
Dept: INTERNAL MEDICINE CLINIC | Age: 59
End: 2019-03-06

## 2019-03-06 NOTE — TELEPHONE ENCOUNTER
Sent pt a [employer] message that referral had been approved and faxed, and pt responded back.

## 2019-03-06 NOTE — TELEPHONE ENCOUNTER
Pt returning a call she missed in regards to a referral. Best contact [phone].        Message received & copied from [facility]

## 2019-04-17 RX ORDER — HYDROCHLOROTHIAZIDE 25 MG/1
TABLET ORAL
Qty: 30 TAB | Refills: 4 | Status: SHIPPED | OUTPATIENT
Start: 2019-04-17 | End: 2019-08-13 | Stop reason: SDUPTHER

## 2019-04-17 RX ORDER — LOSARTAN POTASSIUM 100 MG/1
100 TABLET ORAL DAILY
Qty: 30 TAB | Refills: 4 | Status: SHIPPED | OUTPATIENT
Start: 2019-04-17 | End: 2020-10-09

## 2019-04-17 NOTE — TELEPHONE ENCOUNTER
Eliz Marques [facility]   Phone Number: [phone]             ----- Message from [address] [place] [address] 951, [facility] sent at 4/17/2019 11:08 AM EDT -----     Vanessa Fillers would like a refill of the following medications:         hydroCHLOROthiazide (HYDRODIURIL) 25 mg tablet Mukul Cheung MD]         losartan (COZAAR) 100 mg tablet Mukul Cheung MD]     Preferred pharmacy: [facility]/PHARMACY #[id number]- [address]

## 2019-04-17 NOTE — TELEPHONE ENCOUNTER
PCP: Li Haro MD    Last appt: 3/1/2019  Future Appointments   Date Time Provider Dank George   8/13/2019  8:15 AM Li Haro MD [facility] 87       Requested Prescriptions     Pending Prescriptions Disp Refills    hydroCHLOROthiazide (HYDRODIURIL) 25 mg tablet 30 Tab 4     Sig: TAKE 1 TABLET BY MOUTH EVERY DAY    losartan (COZAAR) 100 mg tablet 30 Tab 4     Sig: Take 1 Tab by mouth daily.

## 2019-08-12 NOTE — PROGRESS NOTES
HISTORY OF PRESENT ILLNESS  Janiya Johnson is a 61 y.o. female. HPI   Last here 2/13/19. Pt is here to f/u on chronic conditions.      BP is 128/81  She is not monitoring her BP at home  Pt called in April for low BP and was feeling dizzy, decreased losartan dose to 50 mg by email   Continues decreased losartan 50mg daily (from 100 mg) and HCTZ 25mg      Wt today is 173 lbs-- down 14 lbs x lov   congratulated pt on this feat   Pt is doing a modified keto diet which is working well   Lucy Paula 1634 and weight loss       Reviewed labs 2/19   Will get labs today      Pt is not taking vit D currently, just a multivitamin  Advised her to take vit D daily     Recall pt tried and failed effexor 37.5mg and 75mg daily for hot flashes   No current sx     Pt follows with Dr. Kody King (ortho) for neck pain  Reviewed notes 3/01/19: I discussed treatment options with Ms. Stef Pittman today. She presents with diffuse tenderness and pain in every major joint of the body indicative of fibromyalgia and RA. I provided her with a list and referral to rheumatology for further evaluation and prescribed meloxicam. I recommend she follow up with the PCP regarding long term meloxicam control. Follow up with rheumatology. Pt also follows with Dr. Haritha Pineda (ortho) for neck pain  Last visit was 8/3/17   Pt received injections at that time    Gabapentin is helping her sx takes 2 per day       Pt saw Dr Ishmael Marcial (rheum) --was sent by marita for  fibromyalgia 4/17/19 , the patient states she also was told she had RA? Reviewed notes: refered by Dr Kody King, on gabapentin 300 mg capsules BID, BP was 121/70, fibromyalgia, ordered rheumatoid labs    She is on meloxicam daily and gabapentin 300 mg 2 per day daily   Pt states she was sent a letter which said she had RA   Advised pt to f/u for treatment for RA       Pt c/o R hip pain when she gets up from toilet or goes up and down stairs   Reviewed back XR 1/2018:  Bilateral hip and lumbar spine osteoarthritis. Advised to f/u with Dr Jhon Carlson at next appt for injections     Pt c/o thumb problems --just ache in her thumbs, had surgery in the past   Discussed seeing Dr Mei Soto (hand surg) for this         ACP not on file. SDM is her . Provided information in the past.       PREVENTIVE:    Colonoscopy: , Dr. Emerald Soto, repeat 10 years  Pap: [facility], , due   [facility], 18, negative--due  DEXA: 8/10/16, nl, will repeat 3-4 years  Tdap: declines  Pneumovax: not yet needed  [id number]: not yet needed  Shingrix: not yet needed  Flu shot: declines    Eye exam: 10/18  A1C:  5.5,  5.5,  5.8,  5.6   Hep C screen: , negative  Lipids:    EK/16, nsr       Patient Active Problem List    Diagnosis Date Noted    Urgency of urination 2013    Nocturia 2013    Mixed incontinence 2013    Cystocele, lateral 2013    Rectocele 2013    Postmenopausal atrophic vaginitis 2013    Obesity 2013    Infected sebaceous cyst Left post auricular. 2011    HTN (hypertension) 12/15/2009    Chronic neck pain 12/15/2009    DJD (degenerative joint disease) 12/15/2009    Vitamin D deficiency 12/15/2009    Fibrocystic disease of breast 12/15/2009     Current Outpatient Medications   Medication Sig Dispense Refill    hydroCHLOROthiazide (HYDRODIURIL) 25 mg tablet TAKE 1 TABLET BY MOUTH EVERY DAY 30 Tab 4    losartan (COZAAR) 100 mg tablet Take 1 Tab by mouth daily. 30 Tab 4    gabapentin (NEURONTIN) 100 mg capsule Take 3 Caps by mouth daily. 60 Cap 4    ketoconazole (NIZORAL) 2 % topical cream Apply  to affected area daily. 30 g 0    cyanocobalamin, vitamin B-12, (VITAMIN B-12 PO) Take  by mouth.  multivitamin with minerals (ONE-A-DAY 50 PLUS PO) Take  by mouth.  meloxicam (MOBIC) 15 mg tablet Take 15 mg by mouth daily.        Past Surgical History:   Procedure Laterality Date    HX AMPUTATION FINGER      left index    HX CYST REMOVAL  02/2015    from jaw and back     HX CYST REMOVAL  02/2015    from finger    HX HYSTERECTOMY      partial    HX ORTHOPAEDIC      right thumb fracture repair      Lab Results   Component Value Date/Time    WBC 4.4 02/13/2019 08:57 AM    HGB 13.4 02/13/2019 08:57 AM    HCT 40.4 02/13/2019 08:57 AM    PLATELET 382 43/07/1763 08:57 AM    MCV 93 02/13/2019 08:57 AM     Lab Results   Component Value Date/Time    Cholesterol, total 199 02/13/2019 08:57 AM    HDL Cholesterol 63 02/13/2019 08:57 AM    LDL, calculated 105 (H) 02/13/2019 08:57 AM    Triglyceride 154 (H) 02/13/2019 08:57 AM     Lab Results   Component Value Date/Time    GFR est non-AA 65 03/01/2019 11:47 AM    GFR est AA 75 03/01/2019 11:47 AM    Creatinine 0.96 03/01/2019 11:47 AM    BUN 35 (H) 03/01/2019 11:47 AM    Sodium 138 03/01/2019 11:47 AM    Potassium 4.4 03/01/2019 11:47 AM    Chloride 97 03/01/2019 11:47 AM    CO2 27 03/01/2019 11:47 AM    PTH, Intact 28 03/01/2019 11:47 AM        Review of Systems   Respiratory: Negative for shortness of breath. Cardiovascular: Negative for chest pain. Physical Exam   Constitutional: She is oriented to person, place, and time. She appears well-developed and well-nourished. No distress. HENT:   Head: Normocephalic and atraumatic. Right Ear: External ear normal.   Left Ear: External ear normal.   Mouth/Throat: Oropharynx is clear and moist. No oropharyngeal exudate. Eyes: Conjunctivae and EOM are normal. Right eye exhibits no discharge. Left eye exhibits no discharge. Neck: Normal range of motion. Neck supple. Cardiovascular: Normal rate, regular rhythm and normal heart sounds. Exam reveals no gallop and no friction rub. No murmur heard. Pulmonary/Chest: Effort normal and breath sounds normal. No respiratory distress. She has no wheezes. She has no rales. She exhibits no tenderness. Abdominal: Soft. She exhibits no distension.  There is no tenderness. There is no rebound and no guarding. Musculoskeletal: Normal range of motion. She exhibits no edema, tenderness or deformity. Lymphadenopathy:     She has no cervical adenopathy. Neurological: She is alert and oriented to person, place, and time. Coordination normal.   Skin: Skin is warm and dry. No rash noted. She is not diaphoretic. No erythema. No pallor. Psychiatric: She has a normal mood and affect. Her behavior is normal.       ASSESSMENT and PLAN    ICD-10-CM ICD-9-CM    1. Essential hypertension              Controlled on losartan and hctz continue  I10 401.9    2. Class 1 obesity due to excess calories without serious comorbidity with body mass index (BMI) of 30.0 to 30.9 in adult            discussed need for wt loss pt had tried low carb diet doing keto and is successful  E66.09 278.00     Z68.30 V85.30    3. Physical exam, annual            Discussed wt loss, colo utd, mammo and pelvic due, dexa will be completed closer to age 72 declines flu shot eye exam in the fall labs ordered  Z00.00 V70.0     4. Fibromyalgia- continue gabapentin 600 mg per day   5. DJD- follow up with Dr Nick Calderon in regard to possible RA hand surg for ganglion cyst will address hip pain with Dr Nick Calderon has already had imaging in the past     Depression screen reviewed and negative. Scribed by Bearl Seats of [facility], as dictated by Dr. Samira Melchor. Current diagnosis and concerns discussed with pt at length. Pt understands risks and benefits or current treatment plan and medications, and accepts the treatment and medication with any possible risks. Pt asks appropriate questions, which were answered. Pt was instructed to call with any concerns or problems. I have reviewed the note documented by the scribe. The services provided are my own.   The documentation is accurate

## 2019-08-13 ENCOUNTER — OFFICE VISIT (OUTPATIENT)
Dept: INTERNAL MEDICINE CLINIC | Age: 59
End: 2019-08-13

## 2019-08-13 VITALS
DIASTOLIC BLOOD PRESSURE: 78 MMHG | HEIGHT: 66 IN | WEIGHT: 173 LBS | HEART RATE: 55 BPM | TEMPERATURE: 98.1 F | BODY MASS INDEX: 27.8 KG/M2 | SYSTOLIC BLOOD PRESSURE: 120 MMHG | RESPIRATION RATE: 16 BRPM | OXYGEN SATURATION: 99 %

## 2019-08-13 DIAGNOSIS — M79.641 PAIN IN BOTH HANDS: ICD-10-CM

## 2019-08-13 DIAGNOSIS — Z00.00 PHYSICAL EXAM, ANNUAL: Primary | ICD-10-CM

## 2019-08-13 DIAGNOSIS — M79.642 PAIN IN BOTH HANDS: ICD-10-CM

## 2019-08-13 DIAGNOSIS — M16.11 PRIMARY OSTEOARTHRITIS OF RIGHT HIP: ICD-10-CM

## 2019-08-13 DIAGNOSIS — M79.7 FIBROMYALGIA: ICD-10-CM

## 2019-08-13 DIAGNOSIS — I10 ESSENTIAL HYPERTENSION: ICD-10-CM

## 2019-08-13 DIAGNOSIS — E66.09 CLASS 1 OBESITY DUE TO EXCESS CALORIES WITHOUT SERIOUS COMORBIDITY WITH BODY MASS INDEX (BMI) OF 30.0 TO 30.9 IN ADULT: ICD-10-CM

## 2019-08-13 RX ORDER — MELOXICAM 15 MG/1
15 TABLET ORAL DAILY
Qty: 30 TAB | Refills: 5 | Status: SHIPPED | OUTPATIENT
Start: 2019-08-13 | End: 2019-11-09 | Stop reason: SDUPTHER

## 2019-08-13 RX ORDER — GABAPENTIN 300 MG/1
300 CAPSULE ORAL 2 TIMES DAILY
COMMUNITY
End: 2019-08-13 | Stop reason: SDUPTHER

## 2019-08-13 RX ORDER — GABAPENTIN 300 MG/1
300 CAPSULE ORAL 2 TIMES DAILY
Qty: 60 CAP | Refills: 5 | Status: SHIPPED | OUTPATIENT
Start: 2019-08-13 | End: 2020-04-10

## 2019-08-13 RX ORDER — HYDROCHLOROTHIAZIDE 25 MG/1
TABLET ORAL
Qty: 30 TAB | Refills: 5 | Status: SHIPPED | OUTPATIENT
Start: 2019-08-13 | End: 2020-04-02 | Stop reason: SDUPTHER

## 2019-08-13 NOTE — PATIENT INSTRUCTIONS
PLEASE FOLLOW UP WITH DR Kenzie Berger ABOUT POSSIBLE RA    SCHEDULE MAMMOGRAM AND PELVIC EXAM     LAB TODAY

## 2019-08-14 ENCOUNTER — DOCUMENTATION ONLY (OUTPATIENT)
Dept: INTERNAL MEDICINE CLINIC | Age: 59
End: 2019-08-14

## 2019-08-14 LAB
ALBUMIN SERPL-MCNC: 4.2 G/DL (ref 3.5–5.5)
ALBUMIN/GLOB SERPL: 1.6 {RATIO} (ref 1.2–2.2)
ALP SERPL-CCNC: 91 IU/L (ref 39–117)
ALT SERPL-CCNC: 22 IU/L (ref 0–32)
AST SERPL-CCNC: 18 IU/L (ref 0–40)
BILIRUB SERPL-MCNC: 0.3 MG/DL (ref 0–1.2)
BUN SERPL-MCNC: 19 MG/DL (ref 6–24)
BUN/CREAT SERPL: 21 (ref 9–23)
CALCIUM SERPL-MCNC: 9.4 MG/DL (ref 8.7–10.2)
CHLORIDE SERPL-SCNC: 102 MMOL/L (ref 96–106)
CHOLEST SERPL-MCNC: 186 MG/DL (ref 100–199)
CO2 SERPL-SCNC: 26 MMOL/L (ref 20–29)
CREAT SERPL-MCNC: 0.92 MG/DL (ref 0.57–1)
EST. AVERAGE GLUCOSE BLD GHB EST-MCNC: 117 MG/DL
GLOBULIN SER CALC-MCNC: 2.7 G/DL (ref 1.5–4.5)
GLUCOSE SERPL-MCNC: 100 MG/DL (ref 65–99)
HBA1C MFR BLD: 5.7 % (ref 4.8–5.6)
HDLC SERPL-MCNC: 60 MG/DL
LDLC SERPL CALC-MCNC: 93 MG/DL (ref 0–99)
POTASSIUM SERPL-SCNC: 4.5 MMOL/L (ref 3.5–5.2)
PROT SERPL-MCNC: 6.9 G/DL (ref 6–8.5)
SODIUM SERPL-SCNC: 140 MMOL/L (ref 134–144)
TRIGL SERPL-MCNC: 165 MG/DL (ref 0–149)
VLDLC SERPL CALC-MCNC: 33 MG/DL (ref 5–40)

## 2019-11-11 RX ORDER — MELOXICAM 15 MG/1
15 TABLET ORAL DAILY
Qty: 30 TAB | Refills: 5 | Status: SHIPPED | OUTPATIENT
Start: 2019-11-11 | End: 2020-04-10

## 2020-04-03 RX ORDER — HYDROCHLOROTHIAZIDE 25 MG/1
TABLET ORAL
Qty: 30 TAB | Refills: 0 | Status: SHIPPED | OUTPATIENT
Start: 2020-04-03 | End: 2020-07-30 | Stop reason: SDUPTHER

## 2020-04-04 ENCOUNTER — PATIENT MESSAGE (OUTPATIENT)
Dept: INTERNAL MEDICINE CLINIC | Age: 60
End: 2020-04-04

## 2020-04-06 NOTE — TELEPHONE ENCOUNTER
From: Imelda Donnelly  To: Heena Lau MD  Sent: 4/4/2020 10:59 AM EDT  Subject: Update Medical Information    I no longer use [facility] on [facility]   I now use [facility] in [place] on [facility]

## 2020-04-09 NOTE — PROGRESS NOTES
HISTORY OF PRESENT ILLNESS  Javon Mead is a 61 y.o. female. HPI   Last here 8/13/19. Pt is here to f/u on chronic conditions. This is an established visit completed with telemedicine was completed with video assist  the patient acknowledges and agrees to this method of visitation        BP was  128/81 lov, has been 118-127/86 at home   Continues decreased losartan 50mg daily (from 100 mg) and HCTZ 25mg  Rarely gets dizzy spells now      Wt was 173 lbs--  States bp is 175lb today   Her  just had cabg and she is workling on heart healty diet for him   Discussed continued diet and weight loss       Reviewed labs      Pt is not taking vit D currently, just a multivitamin  Advised her to take vit D daily again           Pt follows with Dr. Kerry Marr (ortho) for neck pain  No longer needed to see him   Pt also follows with Dr. Camryn Lozano (ortho) for neck pain  Last visit was 8/3/17   Pt received injections at that time              Pt saw Dr Emil Ibanez (rheum) --was sent by marita for  fibromyalgia 4/17/19 , the patient states she also was told she had RA? reviewed note 8/19--bp was good 130/80 then she was on plaquenil and having her sreekanth [id number] had steroid for hand pain     Was there in October 2019  Started on plaquenil 200mg bid    Had been on gabapentin 300 mg capsules for  Fibromyalgia,--this was stopped    She is  No longer on meloxicam daily      her jt pains are much improved, doing quite well now       Saw zena joseph 1 year ago     ACP not on file. SDM is her .   Provided information in the past.       PREVENTIVE:    Colonoscopy: 9/14, Dr. Raul Olivarez, repeat 10 years  Pap: [facility], 2/18, due   [facility], 2/27/18, negative--due  DEXA: 8/10/16, nl, will repeat 3-4 years  Tdap: declines  Pneumovax: not yet needed  [id number]: not yet needed  Shingrix: not yet needed  Flu shot: declines    Eye exam: 9/19 per pt at [address]  A1C: 7/17 5.5, 1/18 5.5, 8/18 5.8, 2/19 5.6  5.7  Hep C screen: , negative  Lipids:  EK/16, nsr     Patient Active Problem List   Diagnosis Code    HTN (hypertension) I10    Chronic neck pain M54.2, G89.29    DJD (degenerative joint disease) M19.90    Vitamin D deficiency E55.9    Fibrocystic disease of breast N60.19    Infected sebaceous cyst Left post auricular. L72.3, L08.9    Obesity E66.9    Urgency of urination R39.15    Nocturia R35.1    Mixed incontinence N39.46    Cystocele, lateral N81.12    Rectocele N81.6    Postmenopausal atrophic vaginitis N95.2     Current Outpatient Medications   Medication Sig Dispense Refill    hydroxychloroquine (Plaquenil) 200 mg tablet Take 200 mg by mouth two (2) times a day.  hydroCHLOROthiazide (HYDRODIURIL) 25 mg tablet TAKE 1 TABLET BY MOUTH EVERY DAY 30 Tab 0    losartan (COZAAR) 100 mg tablet Take 1 Tab by mouth daily. 30 Tab 4    cyanocobalamin, vitamin B-12, (VITAMIN B-12 PO) Take  by mouth.  multivitamin with minerals (ONE-A-DAY 50 PLUS PO) Take  by mouth.  ketoconazole (NIZORAL) 2 % topical cream Apply  to affected area daily. 30 g 0      Lab Results   Component Value Date/Time    Cholesterol, total 186 2019 08:56 AM    HDL Cholesterol 60 2019 08:56 AM    LDL, calculated 93 2019 08:56 AM    Triglyceride 165 (H) 2019 08:56 AM     Lab Results   Component Value Date/Time    GFR est non-AA 68 2019 08:56 AM    GFR est AA 79 2019 08:56 AM    Creatinine 0.92 2019 08:56 AM    BUN 19 2019 08:56 AM    Sodium 140 2019 08:56 AM    Potassium 4.5 2019 08:56 AM    Chloride 102 2019 08:56 AM    CO2 26 2019 08:56 AM    PTH, Intact 28 2019 11:47 AM        Review of Systems   Respiratory: Negative for shortness of breath. Cardiovascular: Negative for chest pain. Physical Exam  Constitutional:       General: She is not in acute distress. Appearance: Normal appearance.  She is not ill-appearing, toxic-appearing or diaphoretic. HENT:      Head: Normocephalic and atraumatic. Eyes:      General:         Right eye: No discharge. Left eye: No discharge. Conjunctiva/sclera: Conjunctivae normal.   Neurological:      General: No focal deficit present. Mental Status: She is alert and oriented to person, place, and time. Psychiatric:         Mood and Affect: Mood normal.         Behavior: Behavior normal.         ASSESSMENT and PLAN    ICD-10-CM ICD-9-CM    1. Essential hypertension--controlled on losartan 50mg and hct    No longer dizzy/light headed  \  cotninue good home readings  I10 401.9    2. Vitamin D deficiency--restart ddaily vit d     Start 1000u daily  E55.9 268.9    3. IFG (impaired fasting glucose)--check a1c    Diet controlled R73.01 790.21    4. Mixed hyperlipidemia--diet controlled E78.2 272.2    5. Class 1 obesity due to excess calories without serious comorbidity with body mass index (BMI) of 30.0 to 30.9 in adult--wt stable from lov    Down from last year    Continue w wt loss E66.09 278.00     Z68.30 V85.30    6. Rheumatoid arthritis involving multiple sites with positive rheumatoid factor ([facility] [address]    Now controlled on plaquenil continue     F/u holtx pending    Eye exam utd M05.79 714.0        Current diagnosis and concerns discussed with pt at length.  Understands risks and benefits or current treatment plan and medications and accepts the treatment and medication with any possible risks.   Pt asks appropriate questions which were answered.   Pt instructed to call with any concerns or problems.

## 2020-04-10 ENCOUNTER — VIRTUAL VISIT (OUTPATIENT)
Dept: INTERNAL MEDICINE CLINIC | Age: 60
End: 2020-04-10

## 2020-04-10 DIAGNOSIS — E78.2 MIXED HYPERLIPIDEMIA: ICD-10-CM

## 2020-04-10 DIAGNOSIS — I10 ESSENTIAL HYPERTENSION: Primary | ICD-10-CM

## 2020-04-10 DIAGNOSIS — M05.79 RHEUMATOID ARTHRITIS INVOLVING MULTIPLE SITES WITH POSITIVE RHEUMATOID FACTOR (HCC): ICD-10-CM

## 2020-04-10 DIAGNOSIS — E55.9 VITAMIN D DEFICIENCY: ICD-10-CM

## 2020-04-10 DIAGNOSIS — R73.01 IFG (IMPAIRED FASTING GLUCOSE): ICD-10-CM

## 2020-04-10 DIAGNOSIS — E66.09 CLASS 1 OBESITY DUE TO EXCESS CALORIES WITHOUT SERIOUS COMORBIDITY WITH BODY MASS INDEX (BMI) OF 30.0 TO 30.9 IN ADULT: ICD-10-CM

## 2020-04-10 PROBLEM — M06.9 RA (RHEUMATOID ARTHRITIS) (HCC): Status: ACTIVE | Noted: 2020-04-10

## 2020-04-10 RX ORDER — HYDROXYCHLOROQUINE SULFATE 200 MG/1
200 TABLET, FILM COATED ORAL 2 TIMES DAILY
COMMUNITY
End: 2021-12-08

## 2020-10-06 NOTE — PROGRESS NOTES
HISTORY OF PRESENT ILLNESS  Carmen Childers is a 61 y.o. female. HPI     Last here 4/10/20 Pt is here to f/u on chronic conditions. This is an established visit completed with telemedicine was completed with video assist  the patient acknowledges and agrees to this method of visitation    BP at home 120-130/80  Continues on HCTZ 25mg  Discussed continued monitoring   She self dc'd losartan 50 mg due to dizzy spells      Wt was 173 lbs lov - stable   Her clothes fit the same  Discussed continued diet and weight loss       Reviewed labs   Ordered labs      Pt is not taking vit D currently, just a multivitamin  Advised her to take vit D daily again     Pt follows with Dr. Lizzette Sapp (ortho) for neck pain  No longer needed to see him   Pt also follows with Dr. Noemi Orellana (ortho) for neck pain  Last visit was 8/3/17   Pt received injections at that time     Pt saw Dr Kamran Tomlinson (rheum) --was sent by marita for  fibromyalgia 19 , the patient states she also was told she had RA? Las visit       Was there in 2019  Started on plaquenil 200mg bid     No longer taking sreekanth or meloxicam   her jt pains are much improved, doing quite well now        Saw zena joseph 1 year ago     ACP not on file. SDM is her .   Provided information in the past.       PREVENTIVE:    Colonoscopy: , Dr. Gracia Watkins, repeat 10 years  Pap: [facility], , declines  [facility], 18, negative--declines  DEXA: 8/10/16, nl, declines  Tdap: declines  Pneumovax: not yet needed  [id number]: not yet needed  Shingrix: not yet needed  Flu shot: declines    Eye exam:  per pt at [address], due reminded  A1C:  5.5,  5.5,  5.8,  5.6  5.7  Hep C screen: , negative  Lipids:  EK/16, nsr    Patient Active Problem List    Diagnosis Date Noted    RA (rheumatoid arthritis) ([facility] [address] 04/10/2020    Urgency of urination 2013    Nocturia 2013    Mixed incontinence 09/06/2013    Cystocele, lateral 09/06/2013    Rectocele 09/06/2013    Postmenopausal atrophic vaginitis 09/06/2013    Obesity 02/12/2013    Infected sebaceous cyst Left post auricular. 08/24/2011    HTN (hypertension) 12/15/2009    Chronic neck pain 12/15/2009    DJD (degenerative joint disease) 12/15/2009    Vitamin D deficiency 12/15/2009    Fibrocystic disease of breast 12/15/2009     Current Outpatient Medications   Medication Sig Dispense Refill    hydroCHLOROthiazide (HYDRODIURIL) 25 mg tablet TAKE 1 TABLET BY MOUTH EVERY DAY 90 Tab 0    hydroxychloroquine (Plaquenil) 200 mg tablet Take 200 mg by mouth two (2) times a day.  cyanocobalamin, vitamin B-12, (VITAMIN B-12 PO) Take  by mouth.  multivitamin with minerals (ONE-A-DAY 50 PLUS PO) Take  by mouth.  ketoconazole (NIZORAL) 2 % topical cream Apply  to affected area daily.  30 g 0     Past Surgical History:   Procedure Laterality Date    HX AMPUTATION FINGER      left index    HX CYST REMOVAL  02/2015    from jaw and back     HX CYST REMOVAL  02/2015    from finger    HX HYSTERECTOMY      partial    HX ORTHOPAEDIC      right thumb fracture repair      Lab Results   Component Value Date/Time    WBC 4.4 02/13/2019 08:57 AM    HGB 13.4 02/13/2019 08:57 AM    HCT 40.4 02/13/2019 08:57 AM    PLATELET 432 41/70/2737 08:57 AM    MCV 93 02/13/2019 08:57 AM     Lab Results   Component Value Date/Time    Cholesterol, total 186 08/13/2019 08:56 AM    HDL Cholesterol 60 08/13/2019 08:56 AM    LDL, calculated 93 08/13/2019 08:56 AM    Triglyceride 165 (H) 08/13/2019 08:56 AM     Lab Results   Component Value Date/Time    GFR est non-AA 68 08/13/2019 08:56 AM    GFR est AA 79 08/13/2019 08:56 AM    Creatinine 0.92 08/13/2019 08:56 AM    BUN 19 08/13/2019 08:56 AM    Sodium 140 08/13/2019 08:56 AM    Potassium 4.5 08/13/2019 08:56 AM    Chloride 102 08/13/2019 08:56 AM    CO2 26 08/13/2019 08:56 AM    PTH, Intact 28 03/01/2019 11:47 AM Review of Systems   Respiratory: Negative for shortness of breath. Cardiovascular: Negative for chest pain. Physical Exam  Constitutional:       General: She is not in acute distress. Appearance: Normal appearance. She is not ill-appearing, toxic-appearing or diaphoretic. HENT:      Head: Normocephalic and atraumatic. Eyes:      General:         Right eye: No discharge. Left eye: No discharge. Conjunctiva/sclera: Conjunctivae normal.   Pulmonary:      Effort: No respiratory distress. Neurological:      Mental Status: She is alert and oriented to person, place, and time. Mental status is at baseline. Gait: Gait normal.   Psychiatric:         Mood and Affect: Mood normal.         Behavior: Behavior normal.         ASSESSMENT and PLAN    ICD-10-CM ICD-9-CM    1. Essential hypertension         Controlled hydrochlorothiazide 25 mg daily    She has stopped losartan    Continue no changes    Check labs    Continue to monitor blood pressure at home       I10 401.9 LIPID PANEL      METABOLIC PANEL, COMPREHENSIVE      CBC W/O DIFF      HEMOGLOBIN A1C WITH EAG      TSH 3RD GENERATION      VITAMIN D, 25 HYDROXY   2.  Rheumatoid arthritis involving multiple sites with positive rheumatoid factor (HCC)  M05.79 714.0 LIPID PANEL   Stable on Plaquenil doing quite well overdue to see rheumatology discussed is overdue for eye exam discussed this   METABOLIC PANEL, COMPREHENSIVE      CBC W/O DIFF      HEMOGLOBIN A1C WITH EAG      TSH 3RD GENERATION      VITAMIN D, 25 HYDROXY   3. Fibromyalgia  M79.7 729.1 LIPID PANEL      METABOLIC PANEL, COMPREHENSIVE   Improved since being on Plaquenil no longer on gabapentin    Can use Tylenol as needed   CBC W/O DIFF      HEMOGLOBIN A1C WITH EAG      TSH 3RD GENERATION      VITAMIN D, 25 HYDROXY   4. Class 1 obesity due to excess calories without serious comorbidity with body mass index (BMI) of 30.0 to 30.9 in adult  E66.09 278.00 LIPID PANEL    Z68.30 N34.89 METABOLIC PANEL, COMPREHENSIVE   Discussed diet weight loss portion control   CBC W/O DIFF      HEMOGLOBIN A1C WITH EAG      TSH 3RD GENERATION      VITAMIN D, 25 HYDROXY   5. Vitamin D deficiency  E55.9 268.9 LIPID PANEL      METABOLIC PANEL, COMPREHENSIVE   On OTC vitamin D check level treat as needed   CBC W/O DIFF      HEMOGLOBIN A1C WITH EAG      TSH 3RD GENERATION      VITAMIN D, 25 HYDROXY   6. IFG (impaired fasting glucose)  R73.01 790.21 LIPID PANEL      METABOLIC PANEL, COMPREHENSIVE   Check A1c diet controlled   CBC W/O DIFF      HEMOGLOBIN A1C WITH EAG      TSH 3RD GENERATION      VITAMIN D, 25 HYDROXY      Depression screen reviewed and negative      Scribed by Michelle Escoto of [address] [place] [address] [address], as dictated by Dr. Regina Gonzales. Current diagnosis and concerns discussed with pt at length. Pt understands risks and benefits or current treatment plan and medications, and accepts the treatment and medication with any possible risks. Pt asks appropriate questions, which were answered. Pt was instructed to call with any concerns or problems. I have reviewed the note documented by the scribe. The services provided are my own. The documentation is accurate     Lion Mao, who was evaluated through a synchronous (real-time) audio-video encounter, and/or her healthcare decision maker, is aware that it is a billable service, with coverage as determined by her insurance carrier. She provided verbal consent to proceed: Yes, and patient identification was verified. It was conducted pursuant to the emergency declaration under the [facility], [address] [place] [address] authority and the [employer] Act. A caregiver was present when appropriate. Ability to conduct physical exam was limited. I was at home. The patient was at home.

## 2020-10-09 ENCOUNTER — VIRTUAL VISIT (OUTPATIENT)
Dept: INTERNAL MEDICINE CLINIC | Age: 60
End: 2020-10-09
Payer: COMMERCIAL

## 2020-10-09 DIAGNOSIS — E66.09 CLASS 1 OBESITY DUE TO EXCESS CALORIES WITHOUT SERIOUS COMORBIDITY WITH BODY MASS INDEX (BMI) OF 30.0 TO 30.9 IN ADULT: ICD-10-CM

## 2020-10-09 DIAGNOSIS — R73.01 IFG (IMPAIRED FASTING GLUCOSE): ICD-10-CM

## 2020-10-09 DIAGNOSIS — I10 ESSENTIAL HYPERTENSION: Primary | ICD-10-CM

## 2020-10-09 DIAGNOSIS — M05.79 RHEUMATOID ARTHRITIS INVOLVING MULTIPLE SITES WITH POSITIVE RHEUMATOID FACTOR (HCC): ICD-10-CM

## 2020-10-09 DIAGNOSIS — E55.9 VITAMIN D DEFICIENCY: ICD-10-CM

## 2020-10-09 DIAGNOSIS — M79.7 FIBROMYALGIA: ICD-10-CM

## 2020-10-09 PROCEDURE — 99213 OFFICE O/P EST LOW 20 MIN: CPT | Performed by: INTERNAL MEDICINE

## 2020-10-23 LAB
25(OH)D3+25(OH)D2 SERPL-MCNC: 41.6 NG/ML (ref 30–100)
ALBUMIN SERPL-MCNC: 4.4 G/DL (ref 3.8–4.9)
ALBUMIN/GLOB SERPL: 1.6 {RATIO} (ref 1.2–2.2)
ALP SERPL-CCNC: 85 IU/L (ref 39–117)
ALT SERPL-CCNC: 23 IU/L (ref 0–32)
AST SERPL-CCNC: 25 IU/L (ref 0–40)
BILIRUB SERPL-MCNC: 0.3 MG/DL (ref 0–1.2)
BUN SERPL-MCNC: 15 MG/DL (ref 8–27)
BUN/CREAT SERPL: 16 (ref 12–28)
CALCIUM SERPL-MCNC: 9.8 MG/DL (ref 8.7–10.3)
CHLORIDE SERPL-SCNC: 99 MMOL/L (ref 96–106)
CHOLEST SERPL-MCNC: 196 MG/DL (ref 100–199)
CO2 SERPL-SCNC: 29 MMOL/L (ref 20–29)
CREAT SERPL-MCNC: 0.94 MG/DL (ref 0.57–1)
ERYTHROCYTE [DISTWIDTH] IN BLOOD BY AUTOMATED COUNT: 11.7 % (ref 11.7–15.4)
EST. AVERAGE GLUCOSE BLD GHB EST-MCNC: 114 MG/DL
GLOBULIN SER CALC-MCNC: 2.7 G/DL (ref 1.5–4.5)
GLUCOSE SERPL-MCNC: 101 MG/DL (ref 65–99)
HBA1C MFR BLD: 5.6 % (ref 4.8–5.6)
HCT VFR BLD AUTO: 41 % (ref 34–46.6)
HDLC SERPL-MCNC: 67 MG/DL
HGB BLD-MCNC: 13.6 G/DL (ref 11.1–15.9)
LDLC SERPL CALC-MCNC: 99 MG/DL (ref 0–99)
MCH RBC QN AUTO: 30.6 PG (ref 26.6–33)
MCHC RBC AUTO-ENTMCNC: 33.2 G/DL (ref 31.5–35.7)
MCV RBC AUTO: 92 FL (ref 79–97)
PLATELET # BLD AUTO: 266 X10E3/UL (ref 150–450)
POTASSIUM SERPL-SCNC: 4.4 MMOL/L (ref 3.5–5.2)
PROT SERPL-MCNC: 7.1 G/DL (ref 6–8.5)
RBC # BLD AUTO: 4.45 X10E6/UL (ref 3.77–5.28)
SODIUM SERPL-SCNC: 138 MMOL/L (ref 134–144)
TRIGL SERPL-MCNC: 175 MG/DL (ref 0–149)
TSH SERPL DL<=0.005 MIU/L-ACNC: 1.82 UIU/ML (ref 0.45–4.5)
VLDLC SERPL CALC-MCNC: 30 MG/DL (ref 5–40)
WBC # BLD AUTO: 5.5 X10E3/UL (ref 3.4–10.8)

## 2020-12-24 RX ORDER — HYDROCHLOROTHIAZIDE 25 MG/1
TABLET ORAL
Qty: 90 TAB | Refills: 0 | Status: SHIPPED | OUTPATIENT
Start: 2020-12-24 | End: 2021-12-08

## 2020-12-24 NOTE — TELEPHONE ENCOUNTER
Future Appointments:  No future appointments.      Last Appointment With Me:  10/9/2020     Requested Prescriptions     Pending Prescriptions Disp Refills    hydroCHLOROthiazide (HYDRODIURIL) 25 mg tablet 90 Tab 0     Sig: TAKE 1 TABLET BY MOUTH EVERY DAY

## 2021-10-12 NOTE — PROGRESS NOTES
Following rx was faxed to pharmacy with confirmation received:      gabapentin (NEURONTIN) 300 mg capsule [[id number]]     Order Details   Dose: 300 mg Route: Oral Frequency: 2 TIMES DAILY   Dispense Quantity: 60 Cap Refills: 5 Fills remaining: --           Sig: Take 1 Cap by mouth two (2) times a day.  Max Daily Amount: 600 mg.          Written Date: 08/13/19 Expiration Date: --     Start Date: 08/13/19 End Date: --
Health Care Proxy (HCP)

## 2021-11-10 ENCOUNTER — TELEPHONE (OUTPATIENT)
Dept: INTERNAL MEDICINE CLINIC | Age: 61
End: 2021-11-10

## 2021-11-10 NOTE — TELEPHONE ENCOUNTER
----- Message from Dariusz Rabago sent at 11/10/2021 10:07 AM EST -----  Subject: Message to Provider    QUESTIONS  Information for Provider? Pt got disconnected while scheduling an appt. please call to schedule  ---------------------------------------------------------------------------  --------------  CALL BACK INFO  What is the best way for the office to contact you? OK to leave message on   voicemail  Preferred Call Back Phone Number?  [phone]  ---------------------------------------------------------------------------  --------------  SCRIPT ANSWERS  undefined

## 2021-11-10 NOTE — TELEPHONE ENCOUNTER
Patient has been scheduled for the next available appointment with Dr. Bryan Melissa on 12/8/21. Advised patient to keep her schedule open in case of any cancellations.

## 2021-12-07 NOTE — PROGRESS NOTES
HISTORY OF PRESENT ILLNESS  Amish Schmid is a 64 y.o. female. HPI     Last here 10/09/20. Pt is here to f/u on chronic conditions.     BP today is 137/79  BP at home 120/80-88  Continues on HCTZ 25mg-- she has not taken this in the last 6 months or so  Discussed restarting hctz 12.5mg  She self dc'd losartan 50 mg due to dizzy spells      Wt today is 207 lbs, up 34 lbs x lov   Discussed getting back on track with diet and weight loss  She has not been watching her diet reports dietary discretion we discussed this at length cutting calories weight watchers         Reviewed labs    Will get labs today     Pt is not taking vit D currently, just a multivitamin  Advised her to take vit D daily again      Pt follows with Dr. Patrick Jackson (ortho) for neck pain  No longer needed to see him   Pt also follows with Dr. Deana Steinberg (ortho) for neck pain  Last visit was 8/3/17   Pt received injections at that time     Pt saw Dr. Florentino Javier (rheum) --was sent by marita for  fibromyalgia 19 , the patient states she also was told she had RA? Was there in 2019  Not currently taking plaquenil 200mg bid  Not planning on following up       No longer taking sreekanth or meloxicam  Myalgias are much improved, doing quite well now     Saw zena joseph in the past     ACP not on file. SDM is her .   Provided information in the past.       PREVENTIVE:    Colonoscopy: , Dr. Boubacar Callahan, repeat 10 years  Pap: [facility], , declines  [facility], 18, negative--declines  DEXA: 8/10/16, nl, declines  Tdap: declines  Pneumovax: not yet needed  [id number]: not yet needed  Shingrix: will get today 21  Flu shot: declines    Eye exam:   A1C:  5.5,  5.5,  5.8,  5.6  5.7 10/20 5.6  Hep C screen: , negative  Lipids: 10/20 LDL 99  EK/16, nsr  Covid: both [employer]), discussed due for booster    Patient Active Problem List    Diagnosis Date Noted    RA (rheumatoid arthritis) ([facility] [address] 04/10/2020    Mixed incontinence 09/06/2013    Rectocele 09/06/2013    Obesity 02/12/2013    HTN (hypertension) 12/15/2009    Chronic neck pain 12/15/2009    DJD (degenerative joint disease) 12/15/2009    Vitamin D deficiency 12/15/2009    Fibrocystic disease of breast 12/15/2009     Current Outpatient Medications   Medication Sig Dispense Refill    hydroCHLOROthiazide (HYDRODIURIL) 12.5 mg tablet Take 1 Tablet by mouth daily. 90 Tablet 1    ketoconazole (NIZORAL) 2 % topical cream Apply  to affected area daily. 30 g 0     Past Surgical History:   Procedure Laterality Date    HX AMPUTATION FINGER      left index    HX CYST REMOVAL  02/2015    from jaw and back     HX CYST REMOVAL  02/2015    from finger    HX HYSTERECTOMY      partial    HX ORTHOPAEDIC      right thumb fracture repair      Lab Results   Component Value Date/Time    WBC 5.5 10/22/2020 09:02 AM    HGB 13.6 10/22/2020 09:02 AM    HCT 41.0 10/22/2020 09:02 AM    PLATELET 183 73/24/5549 09:02 AM    MCV 92 10/22/2020 09:02 AM     Lab Results   Component Value Date/Time    Cholesterol, total 196 10/22/2020 09:02 AM    HDL Cholesterol 67 10/22/2020 09:02 AM    LDL, calculated 99 10/22/2020 09:02 AM    LDL, calculated 93 08/13/2019 08:56 AM    Triglyceride 175 (H) 10/22/2020 09:02 AM     Lab Results   Component Value Date/Time    GFR est non-AA 66 10/22/2020 09:02 AM    GFR est AA 76 10/22/2020 09:02 AM    Creatinine 0.94 10/22/2020 09:02 AM    BUN 15 10/22/2020 09:02 AM    Sodium 138 10/22/2020 09:02 AM    Potassium 4.4 10/22/2020 09:02 AM    Chloride 99 10/22/2020 09:02 AM    CO2 29 10/22/2020 09:02 AM    PTH, Intact 28 03/01/2019 11:47 AM        Review of Systems   Respiratory: Negative for shortness of breath. Cardiovascular: Negative for chest pain. Physical Exam  Constitutional:       General: She is not in acute distress. Appearance: Normal appearance.  She is not ill-appearing, toxic-appearing or diaphoretic. HENT:      Head: Normocephalic and atraumatic. Right Ear: External ear normal.      Left Ear: External ear normal.   Eyes:      General:         Right eye: No discharge. Left eye: No discharge. Conjunctiva/sclera: Conjunctivae normal.      Pupils: Pupils are equal, round, and reactive to light. Neck:      Vascular: No carotid bruit. Cardiovascular:      Rate and Rhythm: Normal rate and regular rhythm. Heart sounds: No murmur heard. No friction rub. No gallop. Pulmonary:      Effort: No respiratory distress. Breath sounds: Normal breath sounds. No wheezing or rales. Chest:      Chest wall: No tenderness. Abdominal:      General: Abdomen is flat. There is no distension. Palpations: Abdomen is soft. There is no mass. Tenderness: There is no abdominal tenderness. There is no guarding or rebound. Hernia: No hernia is present. Musculoskeletal:         General: Normal range of motion. Cervical back: Normal range of motion and neck supple. Right lower leg: No edema. Left lower leg: No edema. Skin:     General: Skin is warm and dry. Neurological:      Mental Status: She is alert and oriented to person, place, and time. Mental status is at baseline. Coordination: Coordination normal.      Gait: Gait normal.   Psychiatric:         Mood and Affect: Mood normal.         Behavior: Behavior normal.         ASSESSMENT and PLAN    ICD-10-CM ICD-9-CM    1.  Physical exam, annual      Z00.00 V70.0 LIPID PANEL      METABOLIC PANEL, COMPREHENSIVE   Patient is gained significant weight since last office visit which we discussed at length need to work aggressively on portions cutting back on calories cutting out caloric beverages can consider weight watchers    Labs ordered today    Declines mammogram pelvic exam    Reports eye exam was last year    Declines tetanus and flu shot    Had to COVID shots will get booster    Started shingles vaccine series today    Colonoscopy up-to-date   CBC W/O DIFF      TSH 3RD GENERATION      HEMOGLOBIN A1C WITH EAG   2. Primary hypertension       Restart hydrochlorothiazide at lower dose 12.5 mg daily    Check K and creatinine levels     I10 401.9    3. Rheumatoid arthritis involving multiple sites with positive rheumatoid factor (HCC)       Currently no symptoms previously on Plaquenil not interested in follow-up     M05.79 714.0    4. Class 1 obesity due to excess calories without serious comorbidity with body mass index (BMI) of 30.0 to 30.9 in adult  E66.09 278.00    See above Z68.30 V85.30         Depression screen reviewed and negative     Scribed by Manjula Anaya of [facility], as dictated by Dr. Travis Quiñonez. Current diagnosis and concerns discussed with pt at length. Pt understands risks and benefits or current treatment plan and medications, and accepts the treatment and medication with any possible risks. Pt asks appropriate questions, which were answered. Pt was instructed to call with any concerns or problems. I have reviewed the note documented by the scribe. The services provided are my own.   The documentation is accurate

## 2021-12-08 ENCOUNTER — OFFICE VISIT (OUTPATIENT)
Dept: INTERNAL MEDICINE CLINIC | Age: 61
End: 2021-12-08
Payer: COMMERCIAL

## 2021-12-08 VITALS
BODY MASS INDEX: 33.37 KG/M2 | WEIGHT: 207.6 LBS | SYSTOLIC BLOOD PRESSURE: 137 MMHG | OXYGEN SATURATION: 95 % | HEIGHT: 66 IN | RESPIRATION RATE: 16 BRPM | TEMPERATURE: 97.9 F | HEART RATE: 74 BPM | DIASTOLIC BLOOD PRESSURE: 79 MMHG

## 2021-12-08 DIAGNOSIS — I10 PRIMARY HYPERTENSION: ICD-10-CM

## 2021-12-08 DIAGNOSIS — M05.79 RHEUMATOID ARTHRITIS INVOLVING MULTIPLE SITES WITH POSITIVE RHEUMATOID FACTOR (HCC): ICD-10-CM

## 2021-12-08 DIAGNOSIS — E66.09 CLASS 1 OBESITY DUE TO EXCESS CALORIES WITHOUT SERIOUS COMORBIDITY WITH BODY MASS INDEX (BMI) OF 30.0 TO 30.9 IN ADULT: ICD-10-CM

## 2021-12-08 DIAGNOSIS — Z23 ENCOUNTER FOR IMMUNIZATION: ICD-10-CM

## 2021-12-08 DIAGNOSIS — Z00.00 PHYSICAL EXAM, ANNUAL: Primary | ICD-10-CM

## 2021-12-08 PROCEDURE — 90471 IMMUNIZATION ADMIN: CPT | Performed by: INTERNAL MEDICINE

## 2021-12-08 PROCEDURE — 90750 HZV VACC RECOMBINANT IM: CPT | Performed by: INTERNAL MEDICINE

## 2021-12-08 PROCEDURE — 99396 PREV VISIT EST AGE 40-64: CPT | Performed by: INTERNAL MEDICINE

## 2021-12-08 RX ORDER — HYDROCHLOROTHIAZIDE 12.5 MG/1
12.5 TABLET ORAL DAILY
Qty: 90 TABLET | Refills: 1 | Status: SHIPPED | OUTPATIENT
Start: 2021-12-08 | End: 2022-06-02

## 2021-12-09 LAB
ALBUMIN SERPL-MCNC: 3.9 G/DL (ref 3.5–5)
ALBUMIN/GLOB SERPL: 1.1 {RATIO} (ref 1.1–2.2)
ALP SERPL-CCNC: 108 U/L (ref 45–117)
ALT SERPL-CCNC: 45 U/L (ref 12–78)
ANION GAP SERPL CALC-SCNC: 7 MMOL/L (ref 5–15)
AST SERPL-CCNC: 29 U/L (ref 15–37)
BILIRUB SERPL-MCNC: 0.4 MG/DL (ref 0.2–1)
BUN SERPL-MCNC: 14 MG/DL (ref 6–20)
BUN/CREAT SERPL: 15 (ref 12–20)
CALCIUM SERPL-MCNC: 9.4 MG/DL (ref 8.5–10.1)
CHLORIDE SERPL-SCNC: 103 MMOL/L (ref 97–108)
CHOLEST SERPL-MCNC: 232 MG/DL
CO2 SERPL-SCNC: 28 MMOL/L (ref 21–32)
CREAT SERPL-MCNC: 0.96 MG/DL (ref 0.55–1.02)
ERYTHROCYTE [DISTWIDTH] IN BLOOD BY AUTOMATED COUNT: 12.6 % (ref 11.5–14.5)
EST. AVERAGE GLUCOSE BLD GHB EST-MCNC: 111 MG/DL
GLOBULIN SER CALC-MCNC: 3.6 G/DL (ref 2–4)
GLUCOSE SERPL-MCNC: 91 MG/DL (ref 65–100)
HBA1C MFR BLD: 5.5 % (ref 4–5.6)
HCT VFR BLD AUTO: 42.5 % (ref 35–47)
HDLC SERPL-MCNC: 63 MG/DL
HDLC SERPL: 3.7 {RATIO} (ref 0–5)
HGB BLD-MCNC: 13.2 G/DL (ref 11.5–16)
LDLC SERPL CALC-MCNC: 140 MG/DL (ref 0–100)
MCH RBC QN AUTO: 30.7 PG (ref 26–34)
MCHC RBC AUTO-ENTMCNC: 31.1 G/DL (ref 30–36.5)
MCV RBC AUTO: 98.8 FL (ref 80–99)
NRBC # BLD: 0 K/UL (ref 0–0.01)
NRBC BLD-RTO: 0 PER 100 WBC
PLATELET # BLD AUTO: 273 K/UL (ref 150–400)
PMV BLD AUTO: 10 FL (ref 8.9–12.9)
POTASSIUM SERPL-SCNC: 4.6 MMOL/L (ref 3.5–5.1)
PROT SERPL-MCNC: 7.5 G/DL (ref 6.4–8.2)
RBC # BLD AUTO: 4.3 M/UL (ref 3.8–5.2)
SODIUM SERPL-SCNC: 138 MMOL/L (ref 136–145)
TRIGL SERPL-MCNC: 145 MG/DL (ref ?–150)
TSH SERPL DL<=0.05 MIU/L-ACNC: 3.14 UIU/ML (ref 0.36–3.74)
VLDLC SERPL CALC-MCNC: 29 MG/DL
WBC # BLD AUTO: 5.3 K/UL (ref 3.6–11)

## 2022-03-15 ENCOUNTER — OFFICE VISIT (OUTPATIENT)
Dept: INTERNAL MEDICINE CLINIC | Age: 62
End: 2022-03-15

## 2022-03-15 VITALS
WEIGHT: 210 LBS | BODY MASS INDEX: 33.75 KG/M2 | HEIGHT: 66 IN | TEMPERATURE: 98.2 F | DIASTOLIC BLOOD PRESSURE: 78 MMHG | HEART RATE: 70 BPM | RESPIRATION RATE: 16 BRPM | OXYGEN SATURATION: 97 % | SYSTOLIC BLOOD PRESSURE: 135 MMHG

## 2022-03-15 DIAGNOSIS — M25.552 HIP PAIN, LEFT: ICD-10-CM

## 2022-03-15 DIAGNOSIS — E66.09 CLASS 1 OBESITY DUE TO EXCESS CALORIES WITHOUT SERIOUS COMORBIDITY WITH BODY MASS INDEX (BMI) OF 30.0 TO 30.9 IN ADULT: ICD-10-CM

## 2022-03-15 DIAGNOSIS — I10 PRIMARY HYPERTENSION: Primary | ICD-10-CM

## 2022-03-15 DIAGNOSIS — E78.2 MIXED HYPERLIPIDEMIA: ICD-10-CM

## 2022-03-15 DIAGNOSIS — M05.79 RHEUMATOID ARTHRITIS INVOLVING MULTIPLE SITES WITH POSITIVE RHEUMATOID FACTOR (HCC): ICD-10-CM

## 2022-03-15 PROCEDURE — 99214 OFFICE O/P EST MOD 30 MIN: CPT | Performed by: INTERNAL MEDICINE

## 2022-03-15 RX ORDER — METHYLPREDNISOLONE 4 MG/1
TABLET ORAL
Qty: 1 DOSE PACK | Refills: 0 | Status: SHIPPED | OUTPATIENT
Start: 2022-03-15 | End: 2022-06-06

## 2022-03-15 NOTE — PROGRESS NOTES
HISTORY OF PRESENT ILLNESS  Eliot Briones is a 64 y.o. female. HPI     Last here 12/08/21. Pt is here for acute care. She c/o L hip pain  She states that she was walking [facility] on Saturday and felt a snap  Denies fall  Her hip is now hurting, especially when she sits down or lifts her leg  This pain is on the external portion of hip  She has not been taking anything OTC for her sxs  On exam: Tender to palpation over L greater trochanter  Decreased strength on L d/t pain  Will get xr L hip  Discussed could be trochanteric bursitis  Will order steroid pack  Discussed could also be pulled or torn soft tissue, could see ortho if this is the case    BP today is 135/78  No home BP readings to review  Taking hctz 12.5mg  She self dc'd losartan 50 mg due to dizzy spells      Wt today is 210 lbs, up 3 lbs x lov but up over 30 pounds in the last few years  Discussed diet and wt/l  She has cut out bread and pasta  Discussed counting calories, [employer], avoid drinking calories     Reviewed labs       Previously discussed taking vit D  She is not taking this, discussed starting     Pt follows with Dr. Nilesh Frank (ortho) for neck pain  No longer needed to see him   Pt also follows with Dr. Doug Mcmillan (ortho) for neck pain  Last visit was 8/3/17   Pt received injections at that time     Pt saw Dr. Ivett Ruiz (rheum) --was sent by marita for  fibromyalgia 4/17/19 , the patient states she also was told she had RA?, pt states that she has borderline RA  Was there in October 2019  Not currently taking plaquenil 200mg bid  Not planning on following up, discussed following up if she has more joint pain     No longer taking sreekanth or meloxicam  Myalgias are much improved, doing quite well now     Saw zena joseph in the past discussed following up for routine skin check     ACP not on file. SDM is her .   Provided information in the past.       PREVENTIVE:    Colonoscopy: 9/14, Dr. Marquis Mistry, repeat 10 years  Pap: [facility], , declines  [facility], 18, negative--declines  DEXA: 8/10/16, nl, declines  Tdap: declines  Pneumovax: not yet needed  [id number]: not yet needed  Shingrix: 21 will repeat at follow-up in   Flu shot: declines    Eye exam:   A1C:  5.5,  5.5,  5.8,  5.6  5.7 10/20 5.6  5.5  Hep C screen: , negative  Lipids:    EK/16, nsr  Covid: both ([employer]) + Moderna booster    Patient Active Problem List    Diagnosis Date Noted    [facility]oid [facility]) ([facility] [address] 04/10/2020    Mixed incontinence 2013    Rectocele 2013    Obesity 2013    HTN (hypertension) 12/15/2009    Chronic neck pain 12/15/2009    DJD (degenerative joint disease) 12/15/2009    Vitamin D deficiency 12/15/2009    Fibrocystic disease of breast 12/15/2009     Current Outpatient Medications   Medication Sig Dispense Refill    methylPREDNISolone (MEDROL DOSEPACK) 4 mg tablet Per pack 1 Dose Pack 0    hydroCHLOROthiazide (HYDRODIURIL) 12.5 mg tablet Take 1 Tablet by mouth daily. 90 Tablet 1    ketoconazole (NIZORAL) 2 % topical cream Apply  to affected area daily.  30 g 0     Past Surgical History:   Procedure Laterality Date    HX AMPUTATION FINGER      left index    HX CYST REMOVAL  2015    from jaw and back     HX CYST REMOVAL  2015    from finger    HX HYSTERECTOMY      partial    HX ORTHOPAEDIC      right thumb fracture repair      Lab Results   Component Value Date/Time    WBC 5.3 2021 12:08 PM    HGB 13.2 2021 12:08 PM    HCT 42.5 2021 12:08 PM    PLATELET 693  12:08 PM    MCV 98.8 2021 12:08 PM     Lab Results   Component Value Date/Time    Cholesterol, total 232 (H) 2021 12:08 PM    HDL Cholesterol 63 2021 12:08 PM    LDL, calculated 140 (H) 2021 12:08 PM    Triglyceride 145 2021 12:08 PM    CHOL/HDL Ratio 3.7 2021 12:08 PM     Lab Results   Component Value Date/Time GFR est non-AA 59 (L) 12/08/2021 12:08 PM    GFR est AA >60 12/08/2021 12:08 PM    Creatinine 0.96 12/08/2021 12:08 PM    BUN 14 12/08/2021 12:08 PM    Sodium 138 12/08/2021 12:08 PM    Potassium 4.6 12/08/2021 12:08 PM    Chloride 103 12/08/2021 12:08 PM    CO2 28 12/08/2021 12:08 PM    PTH, Intact 28 03/01/2019 11:47 AM        Review of Systems   Respiratory: Negative for shortness of breath. Cardiovascular: Negative for chest pain. Musculoskeletal: Positive for joint pain (L hip pain). Physical Exam  Constitutional:       General: She is not in acute distress. Appearance: Normal appearance. She is not ill-appearing, toxic-appearing or diaphoretic. HENT:      Head: Normocephalic and atraumatic. Right Ear: External ear normal.      Left Ear: External ear normal.   Eyes:      General:         Right eye: No discharge. Left eye: No discharge. Conjunctiva/sclera: Conjunctivae normal.      Pupils: Pupils are equal, round, and reactive to light. Cardiovascular:      Rate and Rhythm: Normal rate and regular rhythm. Heart sounds: No murmur heard. No friction rub. No gallop. Pulmonary:      Effort: No respiratory distress. Breath sounds: Normal breath sounds. No wheezing or rales. Chest:      Chest wall: No tenderness. Musculoskeletal:         General: Tenderness present. Normal range of motion. Cervical back: Normal range of motion and neck supple. Comments: Tender to palpation over L greater trochanter  Decreased strength on L lower ext d/t pain   Skin:     General: Skin is warm and dry. Neurological:      Mental Status: She is alert and oriented to person, place, and time. Mental status is at baseline. Coordination: Coordination normal.      Gait: Gait normal.   Psychiatric:         Mood and Affect: Mood normal.         Behavior: Behavior normal.         ASSESSMENT and PLAN    ICD-10-CM ICD-9-CM    1.  Primary hypertension       She is now back on hydrochlorothiazide blood pressure adequately controlled   I10 401.9    2. Mixed hyperlipidemia       Climbed significantly due to weight gain patient gained over 30 pounds in the last couple of years which we discussed today for now no medication needs to work aggressively on weight loss     E78.2 272.2    3. Class 1 obesity due to excess calories without serious comorbidity with body mass index (BMI) of 30.0 to 30.9 in adult  E66.09 278.00    Has gained a fair amount of weight over the last few years discussed this again today discussed weight watchers discussed calorie counting discussed cutting out caloric beverages Z68.30 V85.30    4. Hip pain, left  M25.552 719.45 XR HIP LT W OR WO PELV 2-3 VWS   Pain in the area of the trochanteric bursitis    But a little bit more acutely tender than I would have expected    We will treat with Medrol Dosepak for possible bursitis we will check plain film of the hip today and refer to Ortho if not improving   REFERRAL TO ORTHOPEDICS   5. Rheumatoid arthritis involving multiple sites with positive rheumatoid factor [facility])       Previously saw rheumatology and at one point had been on Plaquenil not interested in following up unless new symptoms develop M05.79 714.0         Scribed by Keanu Overall of [address], as dictated by Dr. Latosha Valdez. Current diagnosis and concerns discussed with pt at length. Pt understands risks and benefits or current treatment plan and medications, and accepts the treatment and medication with any possible risks. Pt asks appropriate questions, which were answered. Pt was instructed to call with any concerns or problems. I have reviewed the note documented by the scribe. The services provided are my own.   The documentation is accurate

## 2022-03-15 NOTE — PROGRESS NOTES
1. \"Have you been to the ER, urgent care clinic since your last visit? Hospitalized since your last visit? \" No    2. \"Have you seen or consulted any other health care providers outside of the [address] [place] [address] since your last visit? \" No     3. For patients aged 39-70: Has the patient had a colonoscopy / FIT/ Cologuard? Yes - no Care Gap present      If the patient is female:    4. For patients aged 41-77: Has the patient had a mammogram within the past 2 years? No      5. For patients aged 21-65: Has the patient had a pap smear?  No

## 2022-03-19 PROBLEM — M06.9 RA (RHEUMATOID ARTHRITIS) (HCC): Status: ACTIVE | Noted: 2020-04-10

## 2022-06-01 NOTE — PROGRESS NOTES
HISTORY OF PRESENT ILLNESS  Arminda Lozano is a 64 y.o. female. HPI    Last here 3/15/22. Pt is here for routine care.     Lov she c/o L hip pain  She is doing ok with this right now improved after steroid pack gets symptoms off and on discussed physical therapy may need to see Ortho due to arthritis    Reviewed xr L hip 3/15/22:  Increased now moderate left hip osteoarthritis.        Has a history of hypertension  BP today is 114/71  BP at home 124/84 130/83  Taking hctz 12.5mg  She self dc'd losartan 50 mg a long time ago due to dizzy spells      Wt today is 202 lbs, down 8 lbs x lov   recall she had gained close to 40 pounds but is getting back on track  Discussed diet and wt/l, she is now doing [employer]     Reviewed labs       Previously discussed taking vit D  She is not taking this, discussed starting     Pt follows with Dr. Ni Guzman (ortho) for neck pain  No longer needed to see him   Pt also follows with Dr. Jaylan Keenan (ortho) for neck pain  Last visit was 8/3/17   Pt received injections at that time     Pt saw Mario Kwabena (rheum) --was sent by [facility] for  fibromyalgia 4/17/19 , the patient states she also was told she had RA?, pt states that she has borderline RA  Was there in October 2019  Not currently taking plaquenil 200mg bid  Not planning on following up, discussed following up if she has more joint pain for now she is not interested     No longer taking sreekanth or meloxicam  Myalgias are much improved, doing quite well now     Saw zena joseph in the past discussed following up for routine skin check     ACP not on file. SDM is her .   Provided information in the past.       PREVENTIVE:    Colonoscopy: 9/14, Dr. Jae Sheridan, repeat 10 years  Pap: [facility], 2/18, declines  [facility], 2/27/18, negative--declines  DEXA: 8/10/16, nl, declines  Tdap: 7/18  Pneumovax: not yet needed  Odnlruo69: not yet needed  Shingrix: 12/08/21 will gt 2nd dose today 06/06/22  Flu shot: declines    Eye exam:  every other year  A1C:  5.6  5.7 10/20 5.6  5.5  Hep C screen: , negative  Lipids:    EK/16, nsr  Covid: both (Pfizer) + Moderna booster  Patient Active Problem List    Diagnosis Date Noted    RA (rheumatoid arthritis) ([facility] [address] 04/10/2020    Mixed incontinence 2013    Rectocele 2013    Obesity 2013    HTN (hypertension) 12/15/2009    Chronic neck pain 12/15/2009    DJD (degenerative joint disease) 12/15/2009    Vitamin D deficiency 12/15/2009    Fibrocystic disease of breast 12/15/2009     Current Outpatient Medications   Medication Sig Dispense Refill    hydroCHLOROthiazide (HYDRODIURIL) 12.5 mg tablet TAKE 1 TABLET BY MOUTH EVERY DAY 90 Tablet 0     Past Surgical History:   Procedure Laterality Date    HX AMPUTATION FINGER      left index    HX CYST REMOVAL  2015    from jaw and back     HX CYST REMOVAL  2015    from finger    HX HYSTERECTOMY      partial    HX ORTHOPAEDIC      right thumb fracture repair      Lab Results   Component Value Date/Time    WBC 5.3 2021 12:08 PM    HGB 13.2 2021 12:08 PM    HCT 42.5 2021 12:08 PM    PLATELET 139  12:08 PM    MCV 98.8 2021 12:08 PM     Lab Results   Component Value Date/Time    Cholesterol, total 232 (H) 2021 12:08 PM    HDL Cholesterol 63 2021 12:08 PM    LDL, calculated 140 (H) 2021 12:08 PM    Triglyceride 145 2021 12:08 PM    CHOL/HDL Ratio 3.7 2021 12:08 PM     Lab Results   Component Value Date/Time    GFR est non-AA 59 (L) 2021 12:08 PM    GFR est AA >60 2021 12:08 PM    Creatinine 0.96 2021 12:08 PM    BUN 14 2021 12:08 PM    Sodium 138 2021 12:08 PM    Potassium 4.6 2021 12:08 PM    Chloride 103 2021 12:08 PM    CO2 28 2021 12:08 PM    PTH, Intact 2019 11:47 AM        Review of Systems   Respiratory: Negative for shortness of breath. Cardiovascular: Negative for chest pain. Physical Exam  Constitutional:       General: She is not in acute distress. Appearance: Normal appearance. She is not ill-appearing, toxic-appearing or diaphoretic. HENT:      Head: Normocephalic and atraumatic. Right Ear: External ear normal.      Left Ear: External ear normal.   Eyes:      General:         Right eye: No discharge. Left eye: No discharge. Conjunctiva/sclera: Conjunctivae normal.      Pupils: Pupils are equal, round, and reactive to light. Cardiovascular:      Rate and Rhythm: Normal rate and regular rhythm. Heart sounds: No murmur heard. No friction rub. No gallop. Pulmonary:      Effort: No respiratory distress. Breath sounds: Normal breath sounds. No wheezing or rales. Chest:      Chest wall: No tenderness. Musculoskeletal:         General: Normal range of motion. Cervical back: Normal range of motion and neck supple. Right lower leg: No edema. Left lower leg: No edema. Skin:     General: Skin is warm and dry. Comments: Varicose veins   Neurological:      Mental Status: She is alert and oriented to person, place, and time. Mental status is at baseline. Coordination: Coordination normal.      Gait: Gait normal.   Psychiatric:         Mood and Affect: Mood normal.         Behavior: Behavior normal.         ASSESSMENT and PLAN    ICD-10-CM ICD-9-CM    1. Primary hypertension    V88 172.1 METABOLIC PANEL, BASIC      LIPID PANEL   Controlled hydrochlorothiazide check metabolic panel for K and creatinine levels   HEMOGLOBIN A1C WITH EAG      VITAMIN D, 25 HYDROXY   2.  Rheumatoid arthritis involving multiple sites with positive rheumatoid factor (HCC)  O30.58 428.7 METABOLIC PANEL, BASIC      LIPID PANEL   Previously saw rheumatology not interested in follow-up has minimal symptoms at this time   HEMOGLOBIN A1C WITH EAG      VITAMIN D, 25 HYDROXY   3. Vitamin D deficiency  E55.9 268.9 METABOLIC PANEL, BASIC      LIPID PANEL   She is now taking over-the-counter vitamin D again we will check level adjust dose as needed   HEMOGLOBIN A1C WITH EAG      VITAMIN D, 25 HYDROXY   4. Class 1 obesity due to excess calories without serious comorbidity with body mass index (BMI) of 30.0 to 30.9 in adult  A82.48 725.39 METABOLIC PANEL, BASIC   Now on weight watchers continue to work on weight loss Z68.30 V85.30 LIPID PANEL      HEMOGLOBIN A1C WITH EAG      VITAMIN D, 25 HYDROXY   5. Primary osteoarthritis of right hip  W62.44 265.74 METABOLIC PANEL, BASIC      LIPID PANEL   Improved after steroid pack still gets symptoms on and off has moderate arthritis in that hip may benefit from physical therapy down the road for right now she is not interested in additional intervention   HEMOGLOBIN A1C WITH EAG      VITAMIN D, 25 HYDROXY   6. Mixed hyperlipidemia  M39.5 011.4 METABOLIC PANEL, BASIC      LIPID PANEL   Diet controlled getting back on track with weight loss check lipids treat further as needed   HEMOGLOBIN A1C WITH EAG      VITAMIN D, 25 HYDROXY   7. IFG (impaired fasting glucose)  T56.34 939.26 METABOLIC PANEL, BASIC      LIPID PANEL   Diet controlled check A1c   HEMOGLOBIN A1C WITH EAG      VITAMIN D, 25 HYDROXY        Scribed by Terese Dailey of [facility], as dictated by Dr. Laura Armstrong. Current diagnosis and concerns discussed with pt at length. Pt understands risks and benefits or current treatment plan and medications, and accepts the treatment and medication with any possible risks. Pt asks appropriate questions, which were answered. Pt was instructed to call with any concerns or problems. I have reviewed the note documented by the scribe. The services provided are my own.   The documentation is accurate

## 2022-06-06 ENCOUNTER — OFFICE VISIT (OUTPATIENT)
Dept: INTERNAL MEDICINE CLINIC | Age: 62
End: 2022-06-06
Payer: COMMERCIAL

## 2022-06-06 VITALS
SYSTOLIC BLOOD PRESSURE: 114 MMHG | DIASTOLIC BLOOD PRESSURE: 71 MMHG | OXYGEN SATURATION: 98 % | HEIGHT: 66 IN | BODY MASS INDEX: 32.5 KG/M2 | HEART RATE: 75 BPM | RESPIRATION RATE: 16 BRPM | WEIGHT: 202.2 LBS | TEMPERATURE: 98.3 F

## 2022-06-06 DIAGNOSIS — R73.01 IFG (IMPAIRED FASTING GLUCOSE): ICD-10-CM

## 2022-06-06 DIAGNOSIS — Z23 ENCOUNTER FOR IMMUNIZATION: ICD-10-CM

## 2022-06-06 DIAGNOSIS — E78.2 MIXED HYPERLIPIDEMIA: ICD-10-CM

## 2022-06-06 DIAGNOSIS — E66.09 CLASS 1 OBESITY DUE TO EXCESS CALORIES WITHOUT SERIOUS COMORBIDITY WITH BODY MASS INDEX (BMI) OF 30.0 TO 30.9 IN ADULT: ICD-10-CM

## 2022-06-06 DIAGNOSIS — E55.9 VITAMIN D DEFICIENCY: ICD-10-CM

## 2022-06-06 DIAGNOSIS — M05.79 RHEUMATOID ARTHRITIS INVOLVING MULTIPLE SITES WITH POSITIVE RHEUMATOID FACTOR (HCC): ICD-10-CM

## 2022-06-06 DIAGNOSIS — I10 PRIMARY HYPERTENSION: Primary | ICD-10-CM

## 2022-06-06 DIAGNOSIS — M16.11 PRIMARY OSTEOARTHRITIS OF RIGHT HIP: ICD-10-CM

## 2022-06-06 PROCEDURE — 99214 OFFICE O/P EST MOD 30 MIN: CPT | Performed by: INTERNAL MEDICINE

## 2022-06-06 PROCEDURE — 90471 IMMUNIZATION ADMIN: CPT | Performed by: INTERNAL MEDICINE

## 2022-06-06 PROCEDURE — 90750 HZV VACC RECOMBINANT IM: CPT | Performed by: INTERNAL MEDICINE

## 2022-06-07 LAB
25(OH)D3 SERPL-MCNC: 44.9 NG/ML (ref 30–100)
ANION GAP SERPL CALC-SCNC: 5 MMOL/L (ref 5–15)
BUN SERPL-MCNC: 13 MG/DL (ref 6–20)
BUN/CREAT SERPL: 13 (ref 12–20)
CALCIUM SERPL-MCNC: 9.5 MG/DL (ref 8.5–10.1)
CHLORIDE SERPL-SCNC: 102 MMOL/L (ref 97–108)
CHOLEST SERPL-MCNC: 202 MG/DL
CO2 SERPL-SCNC: 32 MMOL/L (ref 21–32)
CREAT SERPL-MCNC: 0.98 MG/DL (ref 0.55–1.02)
EST. AVERAGE GLUCOSE BLD GHB EST-MCNC: 120 MG/DL
GLUCOSE SERPL-MCNC: 105 MG/DL (ref 65–100)
HBA1C MFR BLD: 5.8 % (ref 4–5.6)
HDLC SERPL-MCNC: 46 MG/DL
HDLC SERPL: 4.4 {RATIO} (ref 0–5)
LDLC SERPL CALC-MCNC: 111.8 MG/DL (ref 0–100)
POTASSIUM SERPL-SCNC: 4 MMOL/L (ref 3.5–5.1)
SODIUM SERPL-SCNC: 139 MMOL/L (ref 136–145)
TRIGL SERPL-MCNC: 221 MG/DL (ref ?–150)
VLDLC SERPL CALC-MCNC: 44.2 MG/DL

## 2022-07-12 ENCOUNTER — TRANSCRIBE ORDER (OUTPATIENT)
Dept: SCHEDULING | Age: 62
End: 2022-07-12

## 2022-07-12 DIAGNOSIS — R10.32 LLQ ABDOMINAL PAIN: ICD-10-CM

## 2022-07-12 DIAGNOSIS — K92.1 HEMATOCHEZIA: Primary | ICD-10-CM

## 2022-07-25 ENCOUNTER — HOSPITAL ENCOUNTER (OUTPATIENT)
Dept: CT IMAGING | Age: 62
Discharge: HOME | End: 2022-07-25
Attending: INTERNAL MEDICINE

## 2022-07-25 DIAGNOSIS — K92.1 HEMATOCHEZIA: ICD-10-CM

## 2022-07-25 DIAGNOSIS — R10.32 LLQ ABDOMINAL PAIN: ICD-10-CM

## 2022-07-25 PROCEDURE — 74011000250 HC RX REV CODE- 250: Performed by: INTERNAL MEDICINE

## 2022-07-25 PROCEDURE — 74177 CT ABD & PELVIS W/CONTRAST: CPT

## 2022-07-25 PROCEDURE — 74011000636 HC RX REV CODE- 636: Performed by: INTERNAL MEDICINE

## 2022-07-25 RX ORDER — BARIUM SULFATE 20 MG/ML
900 SUSPENSION ORAL
Status: COMPLETED | OUTPATIENT
Start: 2022-07-25 | End: 2022-07-25

## 2022-07-25 RX ADMIN — BARIUM SULFATE 450 ML: 21 SUSPENSION ORAL at 16:02

## 2022-07-25 RX ADMIN — IOPAMIDOL 100 ML: 755 INJECTION, SOLUTION INTRAVENOUS at 16:02

## 2022-08-17 NOTE — TELEPHONE ENCOUNTER
Name and  confirmed. Called pt to set-up appointment for next week. Pt stated that she is available next week.  Pt is on schedule for  4/10/2020 at 2:30  CP: Killian Almodovar MD    Last appt: 2019  Future Appointments   Date Time Provider Dank George   4/10/2020  2:30 PM Killian Almodovar MD [address]       Requested Prescriptions     Pending Prescriptions Disp Refills    hydroCHLOROthiazide (HYDRODIURIL) 25 mg tablet 30 Tab 0     Sig: TAKE 1 TABLET BY MOUTH EVERY DAY Patient requesting refill of HYDROcodone-acetaminophen (NORCO) 5-325 MG tablet.

## 2022-08-31 RX ORDER — HYDROCHLOROTHIAZIDE 12.5 MG/1
TABLET ORAL
Qty: 90 TABLET | Refills: 0 | Status: SHIPPED | OUTPATIENT
Start: 2022-08-31

## 2022-09-12 ENCOUNTER — TRANSCRIBE ORDER (OUTPATIENT)
Dept: SCHEDULING | Age: 62
End: 2022-09-12

## 2022-09-12 DIAGNOSIS — K57.92 DIVERTICULITIS: Primary | ICD-10-CM

## 2022-09-16 ENCOUNTER — HOSPITAL ENCOUNTER (OUTPATIENT)
Dept: CT IMAGING | Age: 62
Discharge: HOME | End: 2022-09-16
Attending: INTERNAL MEDICINE
Payer: COMMERCIAL

## 2022-09-16 DIAGNOSIS — K57.92 DIVERTICULITIS: ICD-10-CM

## 2022-09-16 PROCEDURE — 74011000636 HC RX REV CODE- 636: Performed by: INTERNAL MEDICINE

## 2022-09-16 PROCEDURE — 74177 CT ABD & PELVIS W/CONTRAST: CPT

## 2022-09-16 RX ADMIN — IOPAMIDOL 100 ML: 755 INJECTION, SOLUTION INTRAVENOUS at 12:01

## 2022-11-10 ENCOUNTER — HOSPITAL ENCOUNTER (OUTPATIENT)
Dept: GENERAL RADIOLOGY | Age: 62
Discharge: HOME | End: 2022-11-10
Attending: SURGERY
Payer: COMMERCIAL

## 2022-11-10 ENCOUNTER — HOSPITAL ENCOUNTER (OUTPATIENT)
Dept: PREADMISSION TESTING | Age: 62
Discharge: HOME | End: 2022-11-10
Attending: SURGERY
Payer: COMMERCIAL

## 2022-11-10 VITALS
SYSTOLIC BLOOD PRESSURE: 146 MMHG | OXYGEN SATURATION: 100 % | HEART RATE: 59 BPM | BODY MASS INDEX: 31.15 KG/M2 | HEIGHT: 65 IN | TEMPERATURE: 97.8 F | RESPIRATION RATE: 18 BRPM | WEIGHT: 186.95 LBS | DIASTOLIC BLOOD PRESSURE: 79 MMHG

## 2022-11-10 LAB
ABO + RH BLD: NORMAL
ALBUMIN SERPL-MCNC: 3.6 G/DL (ref 3.5–5)
ALBUMIN/GLOB SERPL: 0.9 {RATIO} (ref 1.1–2.2)
ALP SERPL-CCNC: 92 U/L (ref 45–117)
ALT SERPL-CCNC: 47 U/L (ref 12–78)
ANION GAP SERPL CALC-SCNC: 7 MMOL/L (ref 5–15)
APPEARANCE UR: CLEAR
APTT PPP: 28.2 SEC (ref 22.1–31)
AST SERPL-CCNC: 34 U/L (ref 15–37)
BACTERIA URNS QL MICRO: ABNORMAL /HPF
BILIRUB SERPL-MCNC: 0.4 MG/DL (ref 0.2–1)
BILIRUB UR QL: NEGATIVE
BLOOD GROUP ANTIBODIES SERPL: NORMAL
BUN SERPL-MCNC: 13 MG/DL (ref 6–20)
BUN/CREAT SERPL: 13 (ref 12–20)
CALCIUM SERPL-MCNC: 9.3 MG/DL (ref 8.5–10.1)
CHLORIDE SERPL-SCNC: 104 MMOL/L (ref 97–108)
CO2 SERPL-SCNC: 29 MMOL/L (ref 21–32)
COLOR UR: ABNORMAL
CREAT SERPL-MCNC: 1.03 MG/DL (ref 0.55–1.02)
EPITH CASTS URNS QL MICRO: ABNORMAL /LPF
ERYTHROCYTE [DISTWIDTH] IN BLOOD BY AUTOMATED COUNT: 11.9 % (ref 11.5–14.5)
EST. AVERAGE GLUCOSE BLD GHB EST-MCNC: 114 MG/DL
GLOBULIN SER CALC-MCNC: 3.9 G/DL (ref 2–4)
GLUCOSE SERPL-MCNC: 124 MG/DL (ref 65–100)
GLUCOSE UR STRIP.AUTO-MCNC: NEGATIVE MG/DL
GRAN CASTS URNS QL MICRO: ABNORMAL /LPF
HBA1C MFR BLD: 5.6 % (ref 4–5.6)
HCT VFR BLD AUTO: 40.6 % (ref 35–47)
HGB BLD-MCNC: 14 G/DL (ref 11.5–16)
HGB UR QL STRIP: NEGATIVE
INR PPP: 1 (ref 0.9–1.1)
KETONES UR QL STRIP.AUTO: ABNORMAL MG/DL
LEUKOCYTE ESTERASE UR QL STRIP.AUTO: ABNORMAL
MAGNESIUM SERPL-MCNC: 2.4 MG/DL (ref 1.6–2.4)
MCH RBC QN AUTO: 32.3 PG (ref 26–34)
MCHC RBC AUTO-ENTMCNC: 34.5 G/DL (ref 30–36.5)
MCV RBC AUTO: 93.5 FL (ref 80–99)
MUCOUS THREADS URNS QL MICRO: ABNORMAL /LPF
NITRITE UR QL STRIP.AUTO: NEGATIVE
NRBC # BLD: 0 K/UL (ref 0–0.01)
NRBC BLD-RTO: 0 PER 100 WBC
PH UR STRIP: 5.5 [PH] (ref 5–8)
PHOSPHATE SERPL-MCNC: 3 MG/DL (ref 2.6–4.7)
PLATELET # BLD AUTO: 277 K/UL (ref 150–400)
PMV BLD AUTO: 9.9 FL (ref 8.9–12.9)
POTASSIUM SERPL-SCNC: 3.6 MMOL/L (ref 3.5–5.1)
PROT SERPL-MCNC: 7.5 G/DL (ref 6.4–8.2)
PROT UR STRIP-MCNC: ABNORMAL MG/DL
PROTHROMBIN TIME: 10.2 SEC (ref 9–11.1)
RBC # BLD AUTO: 4.34 M/UL (ref 3.8–5.2)
RBC #/AREA URNS HPF: ABNORMAL /HPF (ref 0–5)
SODIUM SERPL-SCNC: 140 MMOL/L (ref 136–145)
SP GR UR REFRACTOMETRY: 1.03
SPECIMEN EXP DATE BLD: NORMAL
THERAPEUTIC RANGE,PTTT: NORMAL SECS (ref 58–77)
UA: UC IF INDICATED,UAUC: ABNORMAL
UROBILINOGEN UR QL STRIP.AUTO: 1 EU/DL (ref 0.2–1)
WBC # BLD AUTO: 6.7 K/UL (ref 3.6–11)
WBC URNS QL MICRO: ABNORMAL /HPF (ref 0–4)

## 2022-11-10 PROCEDURE — 71046 X-RAY EXAM CHEST 2 VIEWS: CPT

## 2022-11-10 PROCEDURE — 86900 BLOOD TYPING SEROLOGIC ABO: CPT

## 2022-11-10 PROCEDURE — 36415 COLL VENOUS BLD VENIPUNCTURE: CPT

## 2022-11-10 PROCEDURE — 85610 PROTHROMBIN TIME: CPT

## 2022-11-10 PROCEDURE — 84100 ASSAY OF PHOSPHORUS: CPT

## 2022-11-10 PROCEDURE — 85730 THROMBOPLASTIN TIME PARTIAL: CPT

## 2022-11-10 PROCEDURE — 85027 COMPLETE CBC AUTOMATED: CPT

## 2022-11-10 PROCEDURE — 83735 ASSAY OF MAGNESIUM: CPT

## 2022-11-10 PROCEDURE — 81001 URINALYSIS AUTO W/SCOPE: CPT

## 2022-11-10 PROCEDURE — 93005 ELECTROCARDIOGRAM TRACING: CPT

## 2022-11-10 PROCEDURE — 80053 COMPREHEN METABOLIC PANEL: CPT

## 2022-11-10 PROCEDURE — 83036 HEMOGLOBIN GLYCOSYLATED A1C: CPT

## 2022-11-10 RX ORDER — GABAPENTIN 300 MG/1
600 CAPSULE ORAL ONCE
Status: CANCELLED | OUTPATIENT
Start: 2022-11-10 | End: 2022-11-10

## 2022-11-10 RX ORDER — CHOLECALCIFEROL (VITAMIN D3) 125 MCG
1 CAPSULE ORAL DAILY
COMMUNITY

## 2022-11-10 RX ORDER — SODIUM CHLORIDE, SODIUM LACTATE, POTASSIUM CHLORIDE, CALCIUM CHLORIDE 600; 310; 30; 20 MG/100ML; MG/100ML; MG/100ML; MG/100ML
25 INJECTION, SOLUTION INTRAVENOUS CONTINUOUS
Status: CANCELLED | OUTPATIENT
Start: 2022-11-22

## 2022-11-10 RX ORDER — ACETAMINOPHEN 500 MG
1000 TABLET ORAL ONCE
Status: CANCELLED | OUTPATIENT
Start: 2022-11-10 | End: 2022-11-10

## 2022-11-10 RX ORDER — CELECOXIB 200 MG/1
200 CAPSULE ORAL ONCE
Status: CANCELLED | OUTPATIENT
Start: 2022-11-10 | End: 2022-11-10

## 2022-11-10 RX ORDER — SODIUM CHLORIDE, SODIUM LACTATE, POTASSIUM CHLORIDE, CALCIUM CHLORIDE 600; 310; 30; 20 MG/100ML; MG/100ML; MG/100ML; MG/100ML
75 INJECTION, SOLUTION INTRAVENOUS CONTINUOUS
Status: CANCELLED | OUTPATIENT
Start: 2022-11-10 | End: 2022-11-11

## 2022-11-10 NOTE — PERIOP NOTES

## 2022-11-10 NOTE — PERIOP NOTES
Hibiclens/Chlorhexidine    Preventing Infections Before and After - Your Surgery    IMPORTANT INSTRUCTIONS    Please read and follow these instructions carefully. If you are unable to comply with the below instructions your procedure will be cancelled. Every Night for Three (3) nights before your surgery:  Shower with an antibacterial soap, such as Dial, or the soap provided at your preassessment appointment. A shower is better than a bath for cleaning your skin. If needed, ask someone to help you reach all areas of your body. Dont forget to clean your belly button with every shower. The night before your surgery: If you lose your Hibiclens/chlorhexidine please contact surgery center or you can purchase it at a local pharmacy  On the night before your surgery, shower with an antibacterial soap, such as Dial, or the soap provided at your preassessment appointment. With one packet of Hibiclens/Chlorhexidine in hand, turn water off. Apply Hibiclens antiseptic skin cleanser with a clean, freshly washed washcloth. Gently apply to your body from chin to toes (except the genital area) and especially the area(s) where your incision(s) will be. Leave Hibiclens/Chlorhexidine on your skin for at least 20 seconds. CAUTION: If needed, Hibiclens/chlorhexidine may be used to clean the folds of skin of the legs (such as in the area of the groin) and on your buttocks and hips. However, do not use Hibiclens/Chlorhexidine above the neck or in the genital area (your bottom) or put inside any area of your body. Turn the water back on and rinse. Dry gently with a clean, freshly washed towel. After your shower, do not use any powder, deodorant, perfumes or lotion. Use clean, freshly washed towels and washcloths every time you shower. Wear clean, freshly washed pajamas to bed the night before surgery. Sleep on clean, freshly washed sheets. Do not allow pets to sleep in your bed with you.         The Morning of your surgery:  Shower again thoroughly with an antibacterial soap, such as Dial or the soap provided at your preassessment appointment. If needed, ask someone for help to reach all areas of your body. Dont forget to clean your belly button! Rinse. Dry gently with a clean, freshly washed towel. After your shower, do not use any powder, deodorant, perfumes or lotion prior to surgery. Put on clean, freshly washed clothing. Tips to help prevent infections after your surgery:  Protect your surgical wound from germs:  Hand washing is the most important thing you and your caregivers can do to prevent infections. Keep your bandage clean and dry! Do not touch your surgical wound. Use clean, freshly washed towels and washcloths every time you shower; do not share bath linens with others. Until your surgical wound is healed, wear clothing and sleep on bed linens each day that are clean and freshly washed. Do not allow pets to sleep in your bed with you or touch your surgical wound. Do not smoke - smoking delays wound healing. This may be a good time to stop smoking. If you have diabetes, it is important for you to manage your blood sugar levels properly before your surgery as well as after your surgery. Poorly managed blood sugar levels slow down wound healing and prevent you from healing completely. If you lose your Hibiclens/chlorhexidine, please call the [facility], or it is available for purchase at your pharmacy.                ___________________      ___________________      ________________  (Signature of Patient)          (Witness)                   (Date and Time)

## 2022-11-10 NOTE — PERIOP NOTES
[facility]  Preoperative Instructions        Surgery Date 11/22/22          Time of Arrival to be called at [phone]    1. On the day of your surgery, please report to the Surgical Services Registration Desk and sign in at your designated time. The Surgery Center is located to the right of the Emergency Room. 2. You must have someone with you to drive you home. You should not drive a car for 24 hours following surgery. Please make arrangements for a friend or family member to stay with you for the first 24 hours after your surgery. 3. Refer to your handbook for instructions on drinking liquids prior to surgery. 4. We recommend you do not drink any alcoholic beverages for 24 hours before and after your surgery. 5. Contact your surgeons office for instructions on the following medications: non-steroidal anti-inflammatory drugs (i.e. Advil, Aleve), vitamins, and supplements. (Some surgeons will want you to stop these medications prior to surgery and others may allow you to take them)  **If you are currently taking Plavix, Coumadin, Aspirin and/or other blood-thinning agents, contact your surgeon for instructions. ** Your surgeon will partner with the physician prescribing these medications to determine if it is safe to stop or if you need to continue taking. Please do not stop taking these medications without instructions from your surgeon    6. Wear comfortable clothes. Wear glasses instead of contacts. Do not bring any money or jewelry. Please bring picture ID, insurance card, and any prearranged co-payment or hospital payment. Do not wear make-up, particularly mascara the morning of your surgery. Do not wear nail polish, particularly if you are having foot /hand surgery. Wear your hair loose or down, no ponytails, buns, milan pins or clips. All body piercings must be removed.   Please shower with antibacterial soap for three consecutive days before and on the morning of surgery, but do not apply any lotions, powders or deodorants after the shower on the day of surgery. Please use a fresh towels after each shower. Please sleep in clean clothes and change bed linens the night before surgery. Please do not shave for 48 hours prior to surgery. Shaving of the face is acceptable. 7. You should understand that if you do not follow these instructions your surgery may be cancelled. If your physical condition changes (I.e. fever, cold or flu) please contact your surgeon as soon as possible. 8. It is important that you be on time. If a situation occurs where you may be late, please call [phone] (OR Holding Area). 9. If you have any questions and or problems, please call [phone] (Pre-admission Testing). 10. Your surgery time may be subject to change. You will receive a phone call the evening prior with your arrival time. 11.  If having outpatient surgery, you must have someone to drive you here, stay with you during the duration of your stay, and to drive you home at time of discharge. Special Instructions: follow per surgeon    TAKE ALL MEDICATIONS THE DAY OF SURGERY EXCEPT: vitamin d. May take other medication with a sip of water. I understand a pre-operative phone call will be made to verify my surgery time. In the event that I am not available, I give permission for a message to be left on my answering service and/or with another person?   yes         ___________________      __________   _________    (Signature of Patient)             (Witness)                (Date and Time)

## 2022-11-10 NOTE — PERIOP NOTES
Orthopedic and Spine Patients: Instructions on When You Can   Eat or Drink Before Surgery      You have been provided 2 pre-surgery drinks received at your pre-admission testing appointment. Night before surgery: You should drink one bottle of the  pre-surgery drink at bedtime. No food after midnight! Day of Surgery:  Complete 2nd bottle of the pre-surgery drink 1 hour prior to arrival at hospital.  For questions call Pre-Admission Testing at [phone]. They are available from 8:00am-5:00pm, Monday through Friday. Orthopedic and Spine Patients:   Instructions on When You Can   Eat or Drink Before Surgery

## 2022-11-11 LAB
ATRIAL RATE: 59 BPM
BACTERIA SPEC CULT: NORMAL
BACTERIA SPEC CULT: NORMAL
CALCULATED P AXIS, ECG09: 42 DEGREES
CALCULATED R AXIS, ECG10: -3 DEGREES
CALCULATED T AXIS, ECG11: -30 DEGREES
DIAGNOSIS, 93000: NORMAL
P-R INTERVAL, ECG05: 188 MS
Q-T INTERVAL, ECG07: 420 MS
QRS DURATION, ECG06: 92 MS
QTC CALCULATION (BEZET), ECG08: 415 MS
SERVICE CMNT-IMP: NORMAL
VENTRICULAR RATE, ECG03: 59 BPM

## 2022-11-14 RX ORDER — METOCLOPRAMIDE 10 MG/1
10 TABLET ORAL
COMMUNITY
Start: 2022-11-21 | End: 2022-11-24

## 2022-11-14 RX ORDER — METRONIDAZOLE 500 MG/1
500 TABLET ORAL
COMMUNITY
Start: 2022-11-21 | End: 2022-11-24

## 2022-11-14 RX ORDER — NEOMYCIN SULFATE 500 MG/1
1000 TABLET ORAL
COMMUNITY
Start: 2022-11-21 | End: 2022-11-24

## 2022-11-14 RX ORDER — POLYETHYLENE GLYCOL 3350, SODIUM SULFATE, SODIUM CHLORIDE, POTASSIUM CHLORIDE, ASCORBIC ACID, SODIUM ASCORBATE 140-9-5.2G
KIT ORAL
COMMUNITY
End: 2022-11-24

## 2022-11-14 NOTE — ADVANCED PRACTICE NURSE
EKG from 11/10/22 reviewed with Dr. Cecelia Rae, anesthesiologist and compared with previous EKG from 7/25/2016. Planned procedure, PMHx, functional status reviewed.  Pt ok to proceed with planned procedure per Dr. Cecelia Rae

## 2022-11-21 ENCOUNTER — ANESTHESIA EVENT (OUTPATIENT)
Dept: SURGERY | Age: 62
DRG: 331 | End: 2022-11-21
Payer: COMMERCIAL

## 2022-11-21 NOTE — ANESTHESIA PREPROCEDURE EVALUATION
Relevant Problems   CARDIOVASCULAR   (+) HTN (hypertension)      ENDOCRINE   (+) Obesity   (+) RA (rheumatoid arthritis) (HCC)       Anesthetic History   No history of anesthetic complications            Review of Systems / Medical History  Patient summary reviewed, nursing notes reviewed and pertinent labs reviewed    Pulmonary  Within defined limits                 Neuro/Psych   Within defined limits           Cardiovascular    Hypertension              Exercise tolerance: >4 METS  Comments: ECG:  Sinus bradycardia   Incomplete right bundle branch block    GI/Hepatic/Renal  Within defined limits              Endo/Other        Obesity and arthritis     Other Findings   Comments: RA           Physical Exam    Airway  Mallampati: II  TM Distance: 4 - 6 cm  Neck ROM: normal range of motion   Mouth opening: Normal     Cardiovascular  Regular rate and rhythm,  S1 and S2 normal,  no murmur, click, rub, or gallop  Rhythm: regular  Rate: normal         Dental  No notable dental hx       Pulmonary  Breath sounds clear to auscultation               Abdominal  GI exam deferred       Other Findings            Anesthetic Plan    ASA: 3  Anesthesia type: general    Monitoring Plan: BIS      Induction: Intravenous  Anesthetic plan and risks discussed with: Patient

## 2022-11-22 ENCOUNTER — ANESTHESIA (OUTPATIENT)
Dept: SURGERY | Age: 62
DRG: 331 | End: 2022-11-22
Payer: COMMERCIAL

## 2022-11-22 ENCOUNTER — HOSPITAL ENCOUNTER (INPATIENT)
Age: 62
LOS: 2 days | Discharge: HOME | DRG: 331 | End: 2022-11-24
Attending: SURGERY | Admitting: SURGERY
Payer: COMMERCIAL

## 2022-11-22 PROBLEM — K57.92 DIVERTICULITIS: Status: ACTIVE | Noted: 2022-11-22

## 2022-11-22 PROCEDURE — 77030035489 HC REDUCR CANN ENDOWR INTU -C: Performed by: SURGERY

## 2022-11-22 PROCEDURE — 88307 TISSUE EXAM BY PATHOLOGIST: CPT

## 2022-11-22 PROCEDURE — 65270000029 HC RM PRIVATE

## 2022-11-22 PROCEDURE — 2709999900 HC NON-CHARGEABLE SUPPLY: Performed by: SURGERY

## 2022-11-22 PROCEDURE — 77030031139 HC SUT VCRL2 J&J -A: Performed by: SURGERY

## 2022-11-22 PROCEDURE — 77030026438 HC STYL ET INTUB CARD -A: Performed by: ANESTHESIOLOGY

## 2022-11-22 PROCEDURE — 74011250636 HC RX REV CODE- 250/636: Performed by: ANESTHESIOLOGY

## 2022-11-22 PROCEDURE — 77030008684 HC TU ET CUF COVD -B: Performed by: ANESTHESIOLOGY

## 2022-11-22 PROCEDURE — 0T788DZ DILATION OF BILATERAL URETERS WITH INTRALUMINAL DEVICE, VIA NATURAL OR ARTIFICIAL OPENING ENDOSCOPIC: ICD-10-PCS | Performed by: UROLOGY

## 2022-11-22 PROCEDURE — 76010000877 HC OR TIME 2.5 TO 3HR INTENSV - TIER 2: Performed by: SURGERY

## 2022-11-22 PROCEDURE — 77030034667 HC ACC PLATFRM ENDO GELPNT AMR -E: Performed by: SURGERY

## 2022-11-22 PROCEDURE — 77030013079 HC BLNKT BAIR HGGR 3M -A: Performed by: ANESTHESIOLOGY

## 2022-11-22 PROCEDURE — 77030016151 HC PROTCTR LNS DFOG COVD -B: Performed by: SURGERY

## 2022-11-22 PROCEDURE — 77030040923 HC STPLR ENDOSC ECHELON J&J -E: Performed by: SURGERY

## 2022-11-22 PROCEDURE — 76210000017 HC OR PH I REC 1.5 TO 2 HR: Performed by: SURGERY

## 2022-11-22 PROCEDURE — 77030010285 HC STPLR INT PRSTRNG COVD -B: Performed by: SURGERY

## 2022-11-22 PROCEDURE — 77030035278 HC STPLR SEAL ENDOWR INTU -B: Performed by: SURGERY

## 2022-11-22 PROCEDURE — 74011000258 HC RX REV CODE- 258: Performed by: SURGERY

## 2022-11-22 PROCEDURE — 77030035277 HC OBTRTR BLDELSS DISP INTU -B: Performed by: SURGERY

## 2022-11-22 PROCEDURE — 77030035029 HC NDL INSUF VERES DISP COVD -B: Performed by: SURGERY

## 2022-11-22 PROCEDURE — 74011250637 HC RX REV CODE- 250/637: Performed by: SURGERY

## 2022-11-22 PROCEDURE — 2709999900 HC NON-CHARGEABLE SUPPLY

## 2022-11-22 PROCEDURE — 77030040259 HC STPLR DEV SUREFORM DVNCI INTU -F: Performed by: SURGERY

## 2022-11-22 PROCEDURE — 74011250636 HC RX REV CODE- 250/636: Performed by: SURGERY

## 2022-11-22 PROCEDURE — 77030018673: Performed by: SURGERY

## 2022-11-22 PROCEDURE — 77030008771 HC TU NG SALEM SUMP -A: Performed by: ANESTHESIOLOGY

## 2022-11-22 PROCEDURE — 77030025171 HC FCPS ENDOSC DISP 2 J&J -B: Performed by: SURGERY

## 2022-11-22 PROCEDURE — 77030005513 HC CATH URETH FOL11 MDII -B: Performed by: SURGERY

## 2022-11-22 PROCEDURE — 77030012961 HC IRR KT CYSTO/TUR ICUM -A: Performed by: SURGERY

## 2022-11-22 PROCEDURE — 0DTN4ZZ RESECTION OF SIGMOID COLON, PERCUTANEOUS ENDOSCOPIC APPROACH: ICD-10-PCS | Performed by: SURGERY

## 2022-11-22 PROCEDURE — 77030002933 HC SUT MCRYL J&J -A: Performed by: SURGERY

## 2022-11-22 PROCEDURE — 77030008606 HC TRCR ENDOSC KII AMR -B: Performed by: SURGERY

## 2022-11-22 PROCEDURE — 77030008756 HC TU IRR SUC STRY -B: Performed by: SURGERY

## 2022-11-22 PROCEDURE — 0DJD8ZZ INSPECTION OF LOWER INTESTINAL TRACT, VIA NATURAL OR ARTIFICIAL OPENING ENDOSCOPIC: ICD-10-PCS | Performed by: SURGERY

## 2022-11-22 PROCEDURE — 74011000254 HC RX REV CODE- 254: Performed by: ANESTHESIOLOGY

## 2022-11-22 PROCEDURE — 74011000250 HC RX REV CODE- 250: Performed by: SURGERY

## 2022-11-22 PROCEDURE — 77030002986 HC SUT PROL J&J -A: Performed by: SURGERY

## 2022-11-22 PROCEDURE — 77030020703 HC SEAL CANN DISP INTU -B: Performed by: SURGERY

## 2022-11-22 PROCEDURE — 77030035279 HC SEAL VSL ENDOWR XI INTU -I2: Performed by: SURGERY

## 2022-11-22 PROCEDURE — 77030002966 HC SUT PDS J&J -A: Performed by: SURGERY

## 2022-11-22 PROCEDURE — 74011000254 HC RX REV CODE- 254: Performed by: SURGERY

## 2022-11-22 PROCEDURE — 76060000036 HC ANESTHESIA 2.5 TO 3 HR: Performed by: SURGERY

## 2022-11-22 PROCEDURE — 8E0W4CZ ROBOTIC ASSISTED PROCEDURE OF TRUNK REGION, PERCUTANEOUS ENDOSCOPIC APPROACH: ICD-10-PCS | Performed by: SURGERY

## 2022-11-22 PROCEDURE — 74011000250 HC RX REV CODE- 250: Performed by: ANESTHESIOLOGY

## 2022-11-22 PROCEDURE — 88305 TISSUE EXAM BY PATHOLOGIST: CPT

## 2022-11-22 PROCEDURE — 0T788DZ DILATION OF BILATERAL URETERS WITH INTRALUMINAL DEVICE, VIA NATURAL OR ARTIFICIAL OPENING ENDOSCOPIC: ICD-10-PCS | Performed by: SURGERY

## 2022-11-22 RX ORDER — SODIUM CHLORIDE, SODIUM LACTATE, POTASSIUM CHLORIDE, CALCIUM CHLORIDE 600; 310; 30; 20 MG/100ML; MG/100ML; MG/100ML; MG/100ML
INJECTION, SOLUTION INTRAVENOUS
Status: DISCONTINUED | OUTPATIENT
Start: 2022-11-22 | End: 2022-11-22 | Stop reason: HOSPADM

## 2022-11-22 RX ORDER — DEXAMETHASONE SODIUM PHOSPHATE 4 MG/ML
INJECTION, SOLUTION INTRA-ARTICULAR; INTRALESIONAL; INTRAMUSCULAR; INTRAVENOUS; SOFT TISSUE AS NEEDED
Status: DISCONTINUED | OUTPATIENT
Start: 2022-11-22 | End: 2022-11-22 | Stop reason: HOSPADM

## 2022-11-22 RX ORDER — ROCURONIUM BROMIDE 10 MG/ML
INJECTION, SOLUTION INTRAVENOUS AS NEEDED
Status: DISCONTINUED | OUTPATIENT
Start: 2022-11-22 | End: 2022-11-22 | Stop reason: HOSPADM

## 2022-11-22 RX ORDER — LIDOCAINE HYDROCHLORIDE 20 MG/ML
INJECTION, SOLUTION EPIDURAL; INFILTRATION; INTRACAUDAL; PERINEURAL AS NEEDED
Status: DISCONTINUED | OUTPATIENT
Start: 2022-11-22 | End: 2022-11-22 | Stop reason: HOSPADM

## 2022-11-22 RX ORDER — HYDROMORPHONE HYDROCHLORIDE 2 MG/ML
INJECTION, SOLUTION INTRAMUSCULAR; INTRAVENOUS; SUBCUTANEOUS AS NEEDED
Status: DISCONTINUED | OUTPATIENT
Start: 2022-11-22 | End: 2022-11-22 | Stop reason: HOSPADM

## 2022-11-22 RX ORDER — BUPIVACAINE HYDROCHLORIDE AND EPINEPHRINE 2.5; 5 MG/ML; UG/ML
INJECTION, SOLUTION EPIDURAL; INFILTRATION; INTRACAUDAL; PERINEURAL AS NEEDED
Status: DISCONTINUED | OUTPATIENT
Start: 2022-11-22 | End: 2022-11-22 | Stop reason: HOSPADM

## 2022-11-22 RX ORDER — FENTANYL CITRATE 50 UG/ML
25 INJECTION, SOLUTION INTRAMUSCULAR; INTRAVENOUS
Status: COMPLETED | OUTPATIENT
Start: 2022-11-22 | End: 2022-11-22

## 2022-11-22 RX ORDER — ONDANSETRON 2 MG/ML
4 INJECTION INTRAMUSCULAR; INTRAVENOUS AS NEEDED
Status: DISCONTINUED | OUTPATIENT
Start: 2022-11-22 | End: 2022-11-22 | Stop reason: HOSPADM

## 2022-11-22 RX ORDER — EPHEDRINE SULFATE/0.9% NACL/PF 50 MG/5 ML
SYRINGE (ML) INTRAVENOUS AS NEEDED
Status: DISCONTINUED | OUTPATIENT
Start: 2022-11-22 | End: 2022-11-22 | Stop reason: HOSPADM

## 2022-11-22 RX ORDER — LIDOCAINE HYDROCHLORIDE ANHYDROUS AND DEXTROSE MONOHYDRATE .8; 5 G/100ML; G/100ML
0.5 INJECTION, SOLUTION INTRAVENOUS CONTINUOUS
Status: DISPENSED | OUTPATIENT
Start: 2022-11-22 | End: 2022-11-23

## 2022-11-22 RX ORDER — FENTANYL CITRATE 50 UG/ML
25 INJECTION, SOLUTION INTRAMUSCULAR; INTRAVENOUS
Status: DISCONTINUED | OUTPATIENT
Start: 2022-11-22 | End: 2022-11-22

## 2022-11-22 RX ORDER — OXYCODONE HYDROCHLORIDE 5 MG/1
5 TABLET ORAL
Status: DISCONTINUED | OUTPATIENT
Start: 2022-11-22 | End: 2022-11-24 | Stop reason: HOSPADM

## 2022-11-22 RX ORDER — KETOROLAC TROMETHAMINE 30 MG/ML
15 INJECTION, SOLUTION INTRAMUSCULAR; INTRAVENOUS EVERY 6 HOURS
Status: DISCONTINUED | OUTPATIENT
Start: 2022-11-22 | End: 2022-11-24 | Stop reason: HOSPADM

## 2022-11-22 RX ORDER — FENTANYL CITRATE 50 UG/ML
INJECTION, SOLUTION INTRAMUSCULAR; INTRAVENOUS AS NEEDED
Status: DISCONTINUED | OUTPATIENT
Start: 2022-11-22 | End: 2022-11-22 | Stop reason: HOSPADM

## 2022-11-22 RX ORDER — ACETAMINOPHEN 325 MG/1
650 TABLET ORAL EVERY 6 HOURS
Status: DISCONTINUED | OUTPATIENT
Start: 2022-11-22 | End: 2022-11-24 | Stop reason: HOSPADM

## 2022-11-22 RX ORDER — SODIUM CHLORIDE, SODIUM LACTATE, POTASSIUM CHLORIDE, CALCIUM CHLORIDE 600; 310; 30; 20 MG/100ML; MG/100ML; MG/100ML; MG/100ML
25 INJECTION, SOLUTION INTRAVENOUS CONTINUOUS
Status: DISCONTINUED | OUTPATIENT
Start: 2022-11-22 | End: 2022-11-22 | Stop reason: HOSPADM

## 2022-11-22 RX ORDER — GABAPENTIN 300 MG/1
600 CAPSULE ORAL ONCE
Status: COMPLETED | OUTPATIENT
Start: 2022-11-22 | End: 2022-11-22

## 2022-11-22 RX ORDER — HYDROMORPHONE HYDROCHLORIDE 1 MG/ML
0.5 INJECTION, SOLUTION INTRAMUSCULAR; INTRAVENOUS; SUBCUTANEOUS
Status: DISCONTINUED | OUTPATIENT
Start: 2022-11-22 | End: 2022-11-24 | Stop reason: HOSPADM

## 2022-11-22 RX ORDER — MIDAZOLAM HYDROCHLORIDE 1 MG/ML
INJECTION, SOLUTION INTRAMUSCULAR; INTRAVENOUS AS NEEDED
Status: DISCONTINUED | OUTPATIENT
Start: 2022-11-22 | End: 2022-11-22 | Stop reason: HOSPADM

## 2022-11-22 RX ORDER — MIDAZOLAM HYDROCHLORIDE 1 MG/ML
1 INJECTION, SOLUTION INTRAMUSCULAR; INTRAVENOUS AS NEEDED
Status: DISCONTINUED | OUTPATIENT
Start: 2022-11-22 | End: 2022-11-22 | Stop reason: HOSPADM

## 2022-11-22 RX ORDER — ENOXAPARIN SODIUM 100 MG/ML
40 INJECTION SUBCUTANEOUS DAILY
Status: DISCONTINUED | OUTPATIENT
Start: 2022-11-23 | End: 2022-11-24 | Stop reason: HOSPADM

## 2022-11-22 RX ORDER — SODIUM CHLORIDE, SODIUM LACTATE, POTASSIUM CHLORIDE, CALCIUM CHLORIDE 600; 310; 30; 20 MG/100ML; MG/100ML; MG/100ML; MG/100ML
75 INJECTION, SOLUTION INTRAVENOUS CONTINUOUS
Status: DISCONTINUED | OUTPATIENT
Start: 2022-11-22 | End: 2022-11-22 | Stop reason: HOSPADM

## 2022-11-22 RX ORDER — ONDANSETRON 4 MG/1
4 TABLET, ORALLY DISINTEGRATING ORAL
Status: DISCONTINUED | OUTPATIENT
Start: 2022-11-22 | End: 2022-11-24 | Stop reason: HOSPADM

## 2022-11-22 RX ORDER — LIDOCAINE HYDROCHLORIDE 20 MG/ML
INJECTION, SOLUTION EPIDURAL; INFILTRATION; INTRACAUDAL; PERINEURAL
Status: DISCONTINUED | OUTPATIENT
Start: 2022-11-22 | End: 2022-11-22 | Stop reason: HOSPADM

## 2022-11-22 RX ORDER — ACETAMINOPHEN 500 MG
1000 TABLET ORAL ONCE
Status: COMPLETED | OUTPATIENT
Start: 2022-11-22 | End: 2022-11-22

## 2022-11-22 RX ORDER — INDOCYANINE GREEN AND WATER 25 MG
KIT INJECTION AS NEEDED
Status: DISCONTINUED | OUTPATIENT
Start: 2022-11-22 | End: 2022-11-22 | Stop reason: HOSPADM

## 2022-11-22 RX ORDER — ONDANSETRON 2 MG/ML
4 INJECTION INTRAMUSCULAR; INTRAVENOUS
Status: DISCONTINUED | OUTPATIENT
Start: 2022-11-22 | End: 2022-11-24 | Stop reason: HOSPADM

## 2022-11-22 RX ORDER — MAGNESIUM SULFATE HEPTAHYDRATE 40 MG/ML
INJECTION, SOLUTION INTRAVENOUS AS NEEDED
Status: DISCONTINUED | OUTPATIENT
Start: 2022-11-22 | End: 2022-11-22 | Stop reason: HOSPADM

## 2022-11-22 RX ORDER — CELECOXIB 200 MG/1
200 CAPSULE ORAL ONCE
Status: COMPLETED | OUTPATIENT
Start: 2022-11-22 | End: 2022-11-22

## 2022-11-22 RX ORDER — OXYCODONE HYDROCHLORIDE 5 MG/1
10 TABLET ORAL
Status: DISCONTINUED | OUTPATIENT
Start: 2022-11-22 | End: 2022-11-24 | Stop reason: HOSPADM

## 2022-11-22 RX ORDER — ONDANSETRON 2 MG/ML
INJECTION INTRAMUSCULAR; INTRAVENOUS AS NEEDED
Status: DISCONTINUED | OUTPATIENT
Start: 2022-11-22 | End: 2022-11-22 | Stop reason: HOSPADM

## 2022-11-22 RX ORDER — SODIUM CHLORIDE 0.9 % (FLUSH) 0.9 %
5-40 SYRINGE (ML) INJECTION EVERY 8 HOURS
Status: DISCONTINUED | OUTPATIENT
Start: 2022-11-22 | End: 2022-11-24 | Stop reason: HOSPADM

## 2022-11-22 RX ORDER — HYDROCHLOROTHIAZIDE 25 MG/1
12.5 TABLET ORAL DAILY
Status: DISCONTINUED | OUTPATIENT
Start: 2022-11-23 | End: 2022-11-24 | Stop reason: HOSPADM

## 2022-11-22 RX ORDER — HYDROMORPHONE HYDROCHLORIDE 1 MG/ML
0.25 INJECTION, SOLUTION INTRAMUSCULAR; INTRAVENOUS; SUBCUTANEOUS
Status: DISCONTINUED | OUTPATIENT
Start: 2022-11-22 | End: 2022-11-24 | Stop reason: HOSPADM

## 2022-11-22 RX ORDER — SODIUM CHLORIDE, SODIUM LACTATE, POTASSIUM CHLORIDE, CALCIUM CHLORIDE 600; 310; 30; 20 MG/100ML; MG/100ML; MG/100ML; MG/100ML
75 INJECTION, SOLUTION INTRAVENOUS CONTINUOUS
Status: DISCONTINUED | OUTPATIENT
Start: 2022-11-22 | End: 2022-11-24 | Stop reason: HOSPADM

## 2022-11-22 RX ORDER — PROPOFOL 10 MG/ML
INJECTION, EMULSION INTRAVENOUS AS NEEDED
Status: DISCONTINUED | OUTPATIENT
Start: 2022-11-22 | End: 2022-11-22 | Stop reason: HOSPADM

## 2022-11-22 RX ORDER — KETAMINE HYDROCHLORIDE 10 MG/ML
INJECTION, SOLUTION INTRAMUSCULAR; INTRAVENOUS AS NEEDED
Status: DISCONTINUED | OUTPATIENT
Start: 2022-11-22 | End: 2022-11-22 | Stop reason: HOSPADM

## 2022-11-22 RX ORDER — SODIUM CHLORIDE 0.9 % (FLUSH) 0.9 %
5-40 SYRINGE (ML) INJECTION AS NEEDED
Status: DISCONTINUED | OUTPATIENT
Start: 2022-11-22 | End: 2022-11-24 | Stop reason: HOSPADM

## 2022-11-22 RX ADMIN — ACETAMINOPHEN 650 MG: 325 TABLET ORAL at 21:05

## 2022-11-22 RX ADMIN — GABAPENTIN 600 MG: 300 CAPSULE ORAL at 06:09

## 2022-11-22 RX ADMIN — Medication 3 AMPULE: at 06:09

## 2022-11-22 RX ADMIN — FENTANYL CITRATE 25 MCG: 50 INJECTION, SOLUTION INTRAMUSCULAR; INTRAVENOUS at 11:08

## 2022-11-22 RX ADMIN — FENTANYL CITRATE 25 MCG: 50 INJECTION, SOLUTION INTRAMUSCULAR; INTRAVENOUS at 11:57

## 2022-11-22 RX ADMIN — INDOCYANINE GREEN 10 MG: KIT INTRAVENOUS at 09:10

## 2022-11-22 RX ADMIN — CEFOXITIN 2 G: 2 INJECTION, POWDER, FOR SOLUTION INTRAVENOUS at 09:54

## 2022-11-22 RX ADMIN — LIDOCAINE HYDROCHLORIDE ANHYDROUS AND DEXTROSE MONOHYDRATE 0.5 MG/KG/HR: .8; 5 INJECTION, SOLUTION INTRAVENOUS at 10:35

## 2022-11-22 RX ADMIN — SODIUM CHLORIDE, POTASSIUM CHLORIDE, SODIUM LACTATE AND CALCIUM CHLORIDE 25 ML/HR: 600; 310; 30; 20 INJECTION, SOLUTION INTRAVENOUS at 06:54

## 2022-11-22 RX ADMIN — ROCURONIUM BROMIDE 10 MG: 10 INJECTION INTRAVENOUS at 09:14

## 2022-11-22 RX ADMIN — FENTANYL CITRATE 50 MCG: 50 INJECTION, SOLUTION INTRAMUSCULAR; INTRAVENOUS at 07:36

## 2022-11-22 RX ADMIN — SUGAMMADEX 200 MG: 100 INJECTION, SOLUTION INTRAVENOUS at 09:55

## 2022-11-22 RX ADMIN — FENTANYL CITRATE 50 MCG: 50 INJECTION, SOLUTION INTRAMUSCULAR; INTRAVENOUS at 09:17

## 2022-11-22 RX ADMIN — DEXAMETHASONE SODIUM PHOSPHATE 4 MG: 4 INJECTION, SOLUTION INTRAMUSCULAR; INTRAVENOUS at 08:27

## 2022-11-22 RX ADMIN — Medication 2 G: at 07:49

## 2022-11-22 RX ADMIN — LIDOCAINE HYDROCHLORIDE 100 MG: 20 INJECTION, SOLUTION EPIDURAL; INFILTRATION; INTRACAUDAL; PERINEURAL at 07:36

## 2022-11-22 RX ADMIN — FENTANYL CITRATE 100 MCG: 50 INJECTION, SOLUTION INTRAMUSCULAR; INTRAVENOUS at 09:59

## 2022-11-22 RX ADMIN — SODIUM CHLORIDE, POTASSIUM CHLORIDE, SODIUM LACTATE AND CALCIUM CHLORIDE: 600; 310; 30; 20 INJECTION, SOLUTION INTRAVENOUS at 07:41

## 2022-11-22 RX ADMIN — CELECOXIB 200 MG: 200 CAPSULE ORAL at 06:09

## 2022-11-22 RX ADMIN — HYDROMORPHONE HYDROCHLORIDE 1 MG: 2 INJECTION, SOLUTION INTRAMUSCULAR; INTRAVENOUS; SUBCUTANEOUS at 10:08

## 2022-11-22 RX ADMIN — ONDANSETRON HYDROCHLORIDE 4 MG: 2 INJECTION, SOLUTION INTRAMUSCULAR; INTRAVENOUS at 09:40

## 2022-11-22 RX ADMIN — SODIUM CHLORIDE, POTASSIUM CHLORIDE, SODIUM LACTATE AND CALCIUM CHLORIDE 75 ML/HR: 600; 310; 30; 20 INJECTION, SOLUTION INTRAVENOUS at 10:25

## 2022-11-22 RX ADMIN — SODIUM CHLORIDE, POTASSIUM CHLORIDE, SODIUM LACTATE AND CALCIUM CHLORIDE 75 ML/HR: 600; 310; 30; 20 INJECTION, SOLUTION INTRAVENOUS at 21:07

## 2022-11-22 RX ADMIN — OXYCODONE 10 MG: 5 TABLET ORAL at 14:35

## 2022-11-22 RX ADMIN — ACETAMINOPHEN 1000 MG: 500 TABLET ORAL at 06:09

## 2022-11-22 RX ADMIN — KETOROLAC TROMETHAMINE 15 MG: 30 INJECTION, SOLUTION INTRAMUSCULAR at 18:00

## 2022-11-22 RX ADMIN — SODIUM CHLORIDE, PRESERVATIVE FREE 10 ML: 5 INJECTION INTRAVENOUS at 21:10

## 2022-11-22 RX ADMIN — MIDAZOLAM HYDROCHLORIDE 2 MG: 1 INJECTION, SOLUTION INTRAMUSCULAR; INTRAVENOUS at 07:28

## 2022-11-22 RX ADMIN — SODIUM CHLORIDE, PRESERVATIVE FREE 10 ML: 5 INJECTION INTRAVENOUS at 14:36

## 2022-11-22 RX ADMIN — FENTANYL CITRATE 25 MCG: 50 INJECTION, SOLUTION INTRAMUSCULAR; INTRAVENOUS at 11:52

## 2022-11-22 RX ADMIN — ACETAMINOPHEN 650 MG: 325 TABLET ORAL at 14:35

## 2022-11-22 RX ADMIN — CEFOXITIN 2 G: 2 INJECTION, POWDER, FOR SOLUTION INTRAVENOUS at 07:50

## 2022-11-22 RX ADMIN — KETAMINE HYDROCHLORIDE 40 MG: 10 INJECTION, SOLUTION INTRAMUSCULAR; INTRAVENOUS at 07:46

## 2022-11-22 RX ADMIN — PROPOFOL 200 MG: 10 INJECTION, EMULSION INTRAVENOUS at 07:36

## 2022-11-22 RX ADMIN — ROCURONIUM BROMIDE 50 MG: 10 INJECTION INTRAVENOUS at 07:36

## 2022-11-22 RX ADMIN — LIDOCAINE HYDROCHLORIDE 110 MG/HR: 20 INJECTION, SOLUTION EPIDURAL; INFILTRATION; INTRACAUDAL; PERINEURAL at 07:49

## 2022-11-22 RX ADMIN — Medication 10 MG: at 07:56

## 2022-11-22 RX ADMIN — SODIUM CHLORIDE, SODIUM LACTATE, POTASSIUM CHLORIDE, CALCIUM CHLORIDE: 600; 310; 30; 20 INJECTION, SOLUTION INTRAVENOUS at 07:28

## 2022-11-22 RX ADMIN — WATER 2.5 MG: 1 INJECTION INTRAMUSCULAR; INTRAVENOUS; SUBCUTANEOUS at 08:38

## 2022-11-22 RX ADMIN — Medication 1 AMPULE: at 18:00

## 2022-11-22 RX ADMIN — FENTANYL CITRATE 25 MCG: 50 INJECTION, SOLUTION INTRAMUSCULAR; INTRAVENOUS at 11:03

## 2022-11-22 RX ADMIN — Medication 10 MG: at 07:54

## 2022-11-22 NOTE — OP NOTES
Operative Report    Patient: January Sheikh MRN: [id number]  SSN: [id number]    YOB: 1960  Age: 58 y.o. Sex: female       Date of Surgery: 11/22/2022     Preoperative Diagnosis: DIVERTICULITIS     Postoperative Diagnosis: DIVERTICULITIS     Surgeon(s) and Role:  Panel 1:     * Corbin Daniel MD - Primary  Panel 2:     * Stuart Delgadillo MD - Primary    Anesthesia: General     Procedure: Procedure(s):  ROBOTIC ASSISTED LAPAROSCOPIC SIGMOID COLECTOMY WITH INTRAOPERATIVE FLEXIBLE SIGMOIDOSCOPY, CYSTOSCOPY, INSERTION OF BILATERAL URETERAL CATHETERS (E R A S) (UROLOGY TO POST)  . Procedure in Detail:   The patient was taken to the operating suite and placed in the supine position. General endotracheal anesthesia was induced. The patient was placed in the lithotomy position. A cystoscopy and bilateral ureteral catheters were placed. For more details of this procedure, please refer to the urology operative note. The patient was then placed in the low lithotomy position with the arms tucked. The abdomen was prepped and draped in the usual sterile fashion. A timeout was performed as per hospital protocol. An 8mm incision was made in the left upper quadrant and a Veress needle was inserted into the abdominal cavity. The abdomen was insufflated. An 8mm robotic trocar was placed into the abdominal cavity under direct visualization using a 5mm laparoscope. A second 8mm robotic trocar was placed in the right mid abdomen and a 12mm robotic trocar was placed in the right lower quadrant. A 5mm assistant trocar was placed in the right upper quadrant. It was through these 4 sites that the entirety of the procedure was completed. The patient was placed in the steep Trendelenburg position with left side up and the robot was docked. The sigmoid colon noted to be chronically inflamed from diverticulitis.   The sigmoid colon was elevated identifying the mesentery overlying the sacral promontory. This mesentery was scored along the medial edge. The avascular plane between the sigmoid colon mesentery and retroperitoneum was identified. The left ureter was identified and swept posteriorly away from the dissection plane. Dissection was continued in a medial-to-lateral plane freeing up the posterior aspect of the mesentery away from the retroperitoneum. The inferior mesenteric artery and vein were skeletonized at the base and they were divided separately using the Vessel Sealer by cauterizing once proximally, once distally and dividing in between. The white line of Toldt was divided all the way up along the lateral peritoneal reflection of the descending colon. This dissection was carried out all the way to the splenic flexure. The distal dissection margin was identified at the rectosigmoid junction where the tinea splayed overlying the sacral promontory. The mesentery was divided to this level. The rectosigmoid junction was divided using a robotic 60mm green load stapler. The proximal margin was identified between the descending and sigmoid colon in an area free of chronic diverticulitis with soft, pliable colon wall. The mesentery was divided to this level. After complete mobilization of the descending colon, the proximal margin was easily able to reach the pelvis deeply without any undue tension. Anesthesia injected ICG dye to confirm excellent blood supply to the proximal and distal margins. The robot was undocked. The right lower quadrant was extended and a mini wound protector was placed. The bowel was exteriorized through this incision and the area toweled off. An automatic pursestring suture was applied at the proximal margin. The bowel was divided and sent to pathology for further examination. A 29mm EEA anvil was placed into the colotomy and the pursestring suture was tied down.   The pericolic fat was cleared off the anvil and a second pursestring suture was placed using 2-0 Prolene. The dirty instruments were removed and gloves were changed. The anvil was dropped back into the abdominal cavity and the abdomen was reinsufflated. EEA sizers were placed transanally up to the transverse staple line. This was followed by the 29 EEA stapler. The spike was deployed and the anvil was mated with the spike. The stapler was closed and fired. Two complete donuts were identified and sent to pathology for further examination. A flexible sigmoidoscopy was then performed by inserting an Olympus colonoscopy transanally up to the anastomosis. The anastomosis was gently insufflated while simultaneously irrigating the pelvis. There was pink healthy mucosa on both sides of the anastomosis, and there were no evidence of leak. The circular staple line was completely intact. The colonoscope was withdrawn. The right lower quadrant incision was closed with 2-0 Vicryl along the peritoneum followed by 0 looped PDS suture along the fascia. All the skin incisions were closed with 4-0 Monocryl. 30ml of 0.25% Marcaine with epinephrine was injected subcutaneously. The patient was awakened, extubated, and taken to the recovery room in stable condition. Estimated Blood Loss:  50ml    Implants: * No implants in log *            Specimens:   ID Type Source Tests Collected by Time Destination   1 : distal ring Preservative Sigmoid  Sheila Cárdenas MD 11/22/2022 2522 Pathology   2 : proximal ring Preservative Sigmoid  Sheila Cárdenas MD 11/22/2022 9408 Pathology   3 : sigmoid Preservative Sigmoid  Sheila Cárdenas MD 11/22/2022 5198 Pathology           Drains: None                Complications: None    Counts: Sponge and needle counts were correct times two.     Signed By:  Rain Villegas MD     November 22, 2022

## 2022-11-22 NOTE — PERIOP NOTES
TRANSFER - OUT REPORT:    Verbal report given to Osvaldo RN(name) on Vandana Rios  being transferred to [facility] [place](unit) for routine post - op       Report consisted of patients Situation, Background, Assessment and   Recommendations(SBAR). Information from the following report(s) OR Summary, Procedure Summary, Intake/Output, and MAR was reviewed with the receiving nurse. Opportunity for questions and clarification was provided. Patient transported with:   O2 @ 2 liters  Tech    Reviewed with receiving nurse that only PACU orders were released. Postop orders will need to be released when pt gets to room.

## 2022-11-22 NOTE — BRIEF OP NOTE
Brief Postoperative Note    Patient: Roxy Hanna  YOB: 1960  MRN: [id number]    Date of Procedure: 11/22/2022     Pre-Op Diagnosis: DIVERTICULITIS    Post-Op Diagnosis: Same as preoperative diagnosis. Procedure(s):  ROBOTIC ASSISTED LAPAROSCOPIC SIGMOID COLECTOMY WITH INTRAOPERATIVE FLEXIBLE SIGMOIDOSCOPY, CYSTOSCOPY, INSERTION OF BILATERAL URETERAL CATHETERS (E R A S) (UROLOGY TO POST)  .     Surgeon(s):  MD Tricia Prescott MD    Surgical Assistant: Surg Asst-1: Irene Tiwari RN    Anesthesia: General     Estimated Blood Loss (mL): less than 50     Complications: None    Specimens:   ID Type Source Tests Collected by Time Destination   1 : distal ring Preservative Sigmoid  Maria T Reyes MD 11/22/2022 6599 Pathology   2 : proximal ring Preservative Sigmoid  Maria T Reyes MD 11/22/2022 8336 Pathology   3 : sigmoid Preservative Sigmoid  Maria T Reyes MD 11/22/2022 0726 Pathology        Implants: * No implants in log *    Drains: * No LDAs found *    Findings: Chronically inflamed sigmoid colon    Electronically Signed by Pramod Murdock MD on 11/22/2022 at 9:54 AM

## 2022-11-22 NOTE — ANESTHESIA POSTPROCEDURE EVALUATION
Procedure(s):  ROBOTIC ASSISTED LAPAROSCOPIC SIGMOID COLECTOMY WITH INTRAOPERATIVE FLEXIBLE SIGMOIDOSCOPY, CYSTOSCOPY, INSERTION OF BILATERAL URETERAL CATHETERS (E R A S) (UROLOGY TO POST)  . .    general    Anesthesia Post Evaluation      Multimodal analgesia: multimodal analgesia used between 6 hours prior to anesthesia start to PACU discharge  Patient location during evaluation: PACU  Level of consciousness: sleepy but conscious  Pain management: adequate  Airway patency: patent  Anesthetic complications: no  Cardiovascular status: acceptable  Respiratory status: acceptable  Hydration status: acceptable  Comments: +Post-Anesthesia Evaluation and Assessment    Patient: Roxy Hanna MRN: [id number]  SSN: [id number]   YOB: 1960  Age: 58 y.o. Sex: female      Cardiovascular Function/Vital Signs    /72   Pulse 74   Temp 37.1 °C (98.7 °F)   Resp 14   Ht 5' 6\" (1.676 m)   Wt 85.3 kg (188 lb 0.8 oz)   SpO2 98%   BMI 30.35 kg/m²     Patient is status post Procedure(s):  ROBOTIC ASSISTED LAPAROSCOPIC SIGMOID COLECTOMY WITH INTRAOPERATIVE FLEXIBLE SIGMOIDOSCOPY, CYSTOSCOPY, INSERTION OF BILATERAL URETERAL CATHETERS (E R A S) (UROLOGY TO POST)  . .    Nausea/Vomiting: Controlled. Postoperative hydration reviewed and adequate. Pain:  Pain Scale 1: Numeric (0 - 10) (11/22/22 1152)  Pain Intensity 1: 7 (11/22/22 1152)   Managed. Neurological Status:   Neuro (WDL): Exceptions to WDL (11/22/22 1015)   At baseline. Mental Status and Level of Consciousness: Arousable. Pulmonary Status:   O2 Device: Nasal cannula (11/22/22 1015)   Adequate oxygenation and airway patent. Complications related to anesthesia: None    Post-anesthesia assessment completed. No concerns.     Signed By: Osorio Scales MD    11/22/2022  Post anesthesia nausea and vomiting:  controlled  Final Post Anesthesia Temperature Assessment:  Normothermia (36.0-37.5 degrees C)      INITIAL Post-op Vital signs:   Vitals Value Taken Time   /72 11/22/22 1145   Temp 37.1 °C (98.7 °F) 11/22/22 1015   Pulse 72 11/22/22 1153   Resp 19 11/22/22 1153   SpO2 99 % 11/22/22 1153   Vitals shown include unvalidated device data.

## 2022-11-22 NOTE — PROGRESS NOTES
Reason for Admission:  Diverticulitis                     RUR Score: 1%                    Plan for utilizing home health:   Declines       PCP: First and Last name:  Hilda Perera MD     Name of Practice:    Are you a current patient: Yes/No:    Approximate date of last visit: 2 months ago   Can you participate in a virtual visit with your PCP:                     Current Advanced Directive/Advance Care Plan: FULL  CM Confirmed patient is a Full Code    Healthcare Decision Maker:   Click here to complete [phone] including selection of the Healthcare Decision Maker Relationship (ie \"Primary\")           , Anupama Andrea, [phone]                  Transition of Care Plan:                    Patient lives at home with her . Patient uses no DME and is independent in care. Patient's  will be her ride at discharge. Patient uses [facility] on [address]. Current Dispo: Home/self.

## 2022-11-22 NOTE — OP NOTES
Operative Note    Patient: Roxy Hanna  YOB: 1960  MRN: [id number]    Date of Procedure: 11/22/2022     Pre-Op Diagnosis: DIVERTICULITIS    Post-Op Diagnosis: Same as preoperative diagnosis. Procedure(s):  CYSTOSCOPY, INSERTION OF BILATERAL URETERAL CATHETERS (E R A S) (UROLOGY TO POST)  . Nella Chow Surgeon(s):  MD Tricia Prescott MD    Surgical Assistant: Surg Asst-1: Irene Tiwari RN    Anesthesia: General     Estimated Blood Loss (mL):  Minimal    Complications: None    Specimens: * No specimens in log *     Implants: * No implants in log *    Drains: * No LDAs found *    Findings: Bladder within normal limits. Bilateral stent placement without complication    Detailed Description of Procedure:   Obtained consent preoperatively as discussed with patient regarding temporary bilateral ureteral stent placement as preventative measure. Risks and benefits have been discussed with patient before anesthesia. Patient had been seen and consented by colorectal surgeon additionally. The patient was brought to the operating room. General anesthesia was induced. Patient was placed in a dorsal lithotomy position. After completion of prep and drape a full timeout was performed verifying patient and procedure. At this point procedure was started with 21 Occitan rigid cystoscope. Normal female urethra with mild pelvic organ prolapse noted. It did not inhibit visualization of the trigone. Visualization of bladder mucosa demonstrated normal trigone, posterior wall, dome, bladder walls and bladder neck. There was a single right orifice and single left orifice. 5 Western Kaya open-ended catheters were placed through the dual-lumen bridge and placed to approximately 22 cm on each side without difficulty. The left side distal portion was cut at an oblique angle to help identify in case of any concerns. Davison catheter was placed.   Suture material was then utilized to secure the ureteral catheters to the Davison catheter. ICG was loaded into syringes and placed in preparation for ICD placement at the discretion of colorectal surgery. Upon completion the case was turned over.     Electronically Signed by Amadou Crystal MD on 11/22/2022 at 8:30 AM

## 2022-11-22 NOTE — PROGRESS NOTES
Nutrition Note    Chart reviewed; RD provided a 5-day supply of Ensure Surgery/Immunonutrition (10 bottles) to the bedside per ERAS protocol and discussed the importance of adequate nutrition/supplement compliance in effort to optimize wound healing and recovery from surgery. Pt agreeable and reports that she enjoyed the Ensure Surgery pre-op. RD anticipates good compliance.      Electronically signed by Sinan Magallon RD, [address]  on 11/22/2022 at 4:20 PM    Contact: ext [phone]

## 2022-11-23 LAB
ANION GAP SERPL CALC-SCNC: 6 MMOL/L (ref 5–15)
BASOPHILS # BLD: 0 K/UL (ref 0–0.1)
BASOPHILS NFR BLD: 0 % (ref 0–1)
BUN SERPL-MCNC: 10 MG/DL (ref 6–20)
BUN/CREAT SERPL: 14 (ref 12–20)
CALCIUM SERPL-MCNC: 8.6 MG/DL (ref 8.5–10.1)
CHLORIDE SERPL-SCNC: 104 MMOL/L (ref 97–108)
CO2 SERPL-SCNC: 27 MMOL/L (ref 21–32)
CREAT SERPL-MCNC: 0.74 MG/DL (ref 0.55–1.02)
DIFFERENTIAL METHOD BLD: ABNORMAL
EOSINOPHIL # BLD: 0 K/UL (ref 0–0.4)
EOSINOPHIL NFR BLD: 0 % (ref 0–7)
ERYTHROCYTE [DISTWIDTH] IN BLOOD BY AUTOMATED COUNT: 12.2 % (ref 11.5–14.5)
GLUCOSE SERPL-MCNC: 105 MG/DL (ref 65–100)
HCT VFR BLD AUTO: 35.8 % (ref 35–47)
HGB BLD-MCNC: 11.7 G/DL (ref 11.5–16)
IMM GRANULOCYTES # BLD AUTO: 0 K/UL (ref 0–0.04)
IMM GRANULOCYTES NFR BLD AUTO: 0 % (ref 0–0.5)
LYMPHOCYTES # BLD: 2.2 K/UL (ref 0.8–3.5)
LYMPHOCYTES NFR BLD: 28 % (ref 12–49)
MCH RBC QN AUTO: 31.3 PG (ref 26–34)
MCHC RBC AUTO-ENTMCNC: 32.7 G/DL (ref 30–36.5)
MCV RBC AUTO: 95.7 FL (ref 80–99)
MONOCYTES # BLD: 0.8 K/UL (ref 0–1)
MONOCYTES NFR BLD: 10 % (ref 5–13)
NEUTS SEG # BLD: 4.7 K/UL (ref 1.8–8)
NEUTS SEG NFR BLD: 62 % (ref 32–75)
NRBC # BLD: 0 K/UL (ref 0–0.01)
NRBC BLD-RTO: 0 PER 100 WBC
PLATELET # BLD AUTO: 219 K/UL (ref 150–400)
PMV BLD AUTO: 9.6 FL (ref 8.9–12.9)
POTASSIUM SERPL-SCNC: 3.5 MMOL/L (ref 3.5–5.1)
RBC # BLD AUTO: 3.74 M/UL (ref 3.8–5.2)
SODIUM SERPL-SCNC: 137 MMOL/L (ref 136–145)
WBC # BLD AUTO: 7.6 K/UL (ref 3.6–11)

## 2022-11-23 PROCEDURE — 65270000029 HC RM PRIVATE

## 2022-11-23 PROCEDURE — 85025 COMPLETE CBC W/AUTO DIFF WBC: CPT

## 2022-11-23 PROCEDURE — 74011000250 HC RX REV CODE- 250: Performed by: SURGERY

## 2022-11-23 PROCEDURE — 36415 COLL VENOUS BLD VENIPUNCTURE: CPT

## 2022-11-23 PROCEDURE — 74011250637 HC RX REV CODE- 250/637: Performed by: SURGERY

## 2022-11-23 PROCEDURE — 80048 BASIC METABOLIC PNL TOTAL CA: CPT

## 2022-11-23 PROCEDURE — 74011250636 HC RX REV CODE- 250/636: Performed by: SURGERY

## 2022-11-23 RX ORDER — MAG HYDROX/ALUMINUM HYD/SIMETH 200-200-20
30 SUSPENSION, ORAL (FINAL DOSE FORM) ORAL
Status: DISCONTINUED | OUTPATIENT
Start: 2022-11-23 | End: 2022-11-24 | Stop reason: HOSPADM

## 2022-11-23 RX ADMIN — ENOXAPARIN SODIUM 40 MG: 100 INJECTION SUBCUTANEOUS at 10:12

## 2022-11-23 RX ADMIN — ACETAMINOPHEN 650 MG: 325 TABLET ORAL at 10:12

## 2022-11-23 RX ADMIN — ACETAMINOPHEN 650 MG: 325 TABLET ORAL at 21:02

## 2022-11-23 RX ADMIN — Medication 1 AMPULE: at 05:31

## 2022-11-23 RX ADMIN — Medication 1 AMPULE: at 17:25

## 2022-11-23 RX ADMIN — SODIUM CHLORIDE, PRESERVATIVE FREE 10 ML: 5 INJECTION INTRAVENOUS at 14:51

## 2022-11-23 RX ADMIN — HYDROCHLOROTHIAZIDE 12.5 MG: 25 TABLET ORAL at 10:12

## 2022-11-23 RX ADMIN — KETOROLAC TROMETHAMINE 15 MG: 30 INJECTION, SOLUTION INTRAMUSCULAR at 01:01

## 2022-11-23 RX ADMIN — SODIUM CHLORIDE, PRESERVATIVE FREE 10 ML: 5 INJECTION INTRAVENOUS at 21:02

## 2022-11-23 RX ADMIN — SODIUM CHLORIDE, POTASSIUM CHLORIDE, SODIUM LACTATE AND CALCIUM CHLORIDE 75 ML/HR: 600; 310; 30; 20 INJECTION, SOLUTION INTRAVENOUS at 14:26

## 2022-11-23 RX ADMIN — KETOROLAC TROMETHAMINE 15 MG: 30 INJECTION, SOLUTION INTRAMUSCULAR at 17:26

## 2022-11-23 RX ADMIN — OXYCODONE 10 MG: 5 TABLET ORAL at 19:53

## 2022-11-23 RX ADMIN — SODIUM CHLORIDE, PRESERVATIVE FREE 10 ML: 5 INJECTION INTRAVENOUS at 05:32

## 2022-11-23 RX ADMIN — KETOROLAC TROMETHAMINE 15 MG: 30 INJECTION, SOLUTION INTRAMUSCULAR at 23:52

## 2022-11-23 RX ADMIN — KETOROLAC TROMETHAMINE 15 MG: 30 INJECTION, SOLUTION INTRAMUSCULAR at 05:28

## 2022-11-23 RX ADMIN — ACETAMINOPHEN 650 MG: 325 TABLET ORAL at 14:50

## 2022-11-23 RX ADMIN — KETOROLAC TROMETHAMINE 15 MG: 30 INJECTION, SOLUTION INTRAMUSCULAR at 14:50

## 2022-11-23 RX ADMIN — ACETAMINOPHEN 650 MG: 325 TABLET ORAL at 05:27

## 2022-11-23 NOTE — PROGRESS NOTES
Surgery NP Progress Note    Bipin Kim  [id number]  female  58 y.o.  1960    s/p ROBOTIC ASSISTED LAPAROSCOPIC SIGMOID COLECTOMY WITH INTRAOPERATIVE FLEXIBLE SIGMOIDOSCOPY, CYSTOSCOPY, INSERTION OF BILATERAL URETERAL CATHETERS (E R A S) (UROLOGY TO POST) on 2022   Pt seen with Dr. Sunitha Guerrier    Assessment:   Active Problems:    Diverticulitis (2022)        Expected post-op progress. Plan/Recommendations/Medical Decision Making:     - Mobilize with nursing and OOB to chair for meals  - Continue diet  - Pain management- Continue current pain control methods.   - VTE Prophylaxis: Lovenox  - D/C tomorrow if continuing to have bowel function and tolerating diet. Subjective:     Patient tolerating diet in small amounts, states she is passing flatus. Mobilizing with assistance. Objective:     Blood pressure 122/84, pulse 62, temperature 98.1 °F (36.7 °C), resp. rate 18, height 5' 6\" (1.676 m), weight 85.3 kg (188 lb 0.8 oz), SpO2 97 %, not currently breastfeeding. Temp (24hrs), Av.8 °F (36.6 °C), Min:97.3 °F (36.3 °C), Max:98.1 °F (36.7 °C)      Pt resting in bed. NAD   Incisions CDI. Abd soft and appropriately tender. SCDs for mechanical DVT proph while in bed     Body mass index is 30.35 kg/m². Reference: BMI greater than 30 is classified as obesity and greater than 40 is classified as morbid obesity.      Last 3 Recorded Weights in this Encounter    22 0654   Weight: 85.3 kg (188 lb 0.8 oz)         Sherice Donnelly NP   MSN, APRN, FNP-C, CWOCN-AP, RNFA    22

## 2022-11-23 NOTE — ACP (ADVANCE CARE PLANNING)
Advance Care Planning   Advance Care Planning Inpatient Note  [address]  Spiritual Care Department    Today's Date: 11/23/2022  Unit: [facility] 3 SURG TELE    Received request from  in basket . Upon review of chart and communication with care team, patient's decision making abilities are not in question. Patient and Spouse was/were present in the room during visit. Goals of ACP Conversation:  Discuss Advance Care planning documents    Health Care Decision Makers:      Primary Decision Maker: Abbey Gomez  [phone]    Summary:  Verified Healthcare Decision Maker     Advance Care Planning Documents (Patient Wishes) on file:  Living Will/ Advance Directive     Assessment:     responded to staff request, through in basket,  for an Advance Medical Directive (AMD) consult with Mrs. Ashley Ann in [phone]. Patient's  Anil Locke was present during visit. She affirmed that she had a conversation with staff about document.  reviewed document and left a copy, at her request, for further review with family a copy of Your Right to Decide booklet was also left. Patient advised of chaplains availability for further support.       Interventions:  Encouraged ongoing ACP conversation with future decision makers and loved ones    Care Preferences Communicated:  No    Outcomes/Plan:  ACP Discussion Completed    Chaplain Joanne on 11/23/2022 at 11:04 AM

## 2022-11-23 NOTE — PROGRESS NOTES
Nutrition Note    RD received an auto-nutrition referral per MST. However, MST indicated 'unsure' weight loss which is a false trigger. Pt's weight trends are stable pre-admit. RD delivered all post-op Ensure Immuno supplements to bedside for a 10-day supply. No new nutrition needs identified at this time.     Electronically signed by Stephany Mason RD on 11/23/2022 at 3:03 PM    Contact:

## 2022-11-23 NOTE — PROGRESS NOTES
adEnd of Shift Note    Bedside shift change report given to Alexa Chanel   (oncoming nurse) by Balaji Cisneros RN (offgoing nurse). Report included the following information SBAR, Intake/Output, Recent Results, and Dual Neuro Assessment    Shift worked:  11-7       Shift summary and any significant changes: Davison catheter removed at 6am     Concerns for physician to address:        Zone phone for oncoming shift:   [phone]       Activity:  Activity Level: Up with Assistance  Number times ambulated in hallways past shift: 0  Number of times OOB to chair past shift: 2    Cardiac:   Cardiac Monitoring: No      Cardiac Rhythm: Sinus Rhythm    Access:  Current line(s): PICC     Genitourinary:   Urinary status: voiding    Respiratory:   O2 Device: None (Room air)  Chronic home O2 use?: YES  Incentive spirometer at bedside: NO       GI:     Current diet:  ADULT DIET Regular; Low Fiber  ADULT ORAL NUTRITION SUPPLEMENT Breakfast, Dinner; Other Supplement; ERAS ONS 5 days upply dropped off  Passing flatus: YES  Tolerating current diet: YES       Pain Management:   Patient states pain is manageable on current regimen: YES    Skin:  Dash Score: 23  Interventions: increase time out of bed and internal/external urinary devices    Patient Safety:  Fall Score:  Total Score: 0  Interventions: pt to call before getting OOB       Length of Stay:  Expected LOS: 3d 7h  Actual LOS: Professor Gómez Henry, RN

## 2022-11-23 NOTE — PROGRESS NOTES
Spiritual Care Assessment/Progress Note  [facility]      NAME: Valentina Bermudez      MRN: [id number]  AGE: 58 y.o.  SEX: female  Muslim Affiliation: No preference   Language: English     11/23/2022     Total Time (in minutes): 35     Spiritual Assessment begun in [facility] 3 SURG TELE through conversation with:         [x]Patient        [x] Family    [] Friend(s)        Reason for Consult: Advance medical directive consult     Spiritual beliefs: (Please include comment if needed)     [x] Identifies with a celeste tradition:         [] Supported by a celeste community:            [] Claims no spiritual orientation:           [] Seeking spiritual identity:                [] Adheres to an individual form of spirituality:           [] Not able to assess:                           Identified resources for coping:      [] Prayer                               [] Music                  [] Guided Imagery     [x] Family/friends                 [] Pet visits     [] Devotional reading                         [] Unknown     [] Other:                                                Interventions offered during this visit: (See comments for more details)    Patient Interventions: Coping skills reviewed/reinforced, Initial/Spiritual assessment, patient floor, Normalization of emotional/spiritual concerns, Life review/legacy, Prayer (assurance of), Muslim beliefs/image of God discussed, Integration of medical assessment with existing values and beliefs, Advance medical directive consult, Affirmation of celeste, Catharsis/review of pertinent events in supportive environment, Iconic (affirming the presence of God/Higher Power)           Plan of Care:     [] Support spiritual and/or cultural needs    [x] Support AMD and/or advance care planning process      [] Support grieving process   [] Coordinate Rites and/or Rituals    [] Coordination with community clergy   [] No spiritual needs identified at this time   [] Detailed Plan of Care below (See Comments)  [] Make referral to Music Therapy  [] Make referral to Pet Therapy     [] Make referral to Addiction services  [] Make referral to [facility]  [] Make referral to Spiritual Care Partner  [] No future visits requested        [x] Contact Spiritual Care for further referrals     Comments:   responded to staff request, through in basket,  for an Advance Medical Directive (AMD) consult with Mrs. Vazquez Vega in [phone]. Patient's  Nicolette Villagomez was present during visit. She affirmed that she had a conversation with staff about document.  reviewed document and left a copy, at her request, for further review with family a copy of Your Right to Decide booklet was also left. Patient advised of chaplains availability for further support.  also initiated conversation and explored coping resources for support. Patient indicated she was doing good at this time. She shared that she is [place], spouse is Atheist. They don't talk about Jainism to save themselves from arguments. Patient stated that spouse is perfect in his support. Spouse some of the experiences leading to his current spiritual orientation.  affirmed thoughts and feelings and assured them of spiritual care availability through staff referrals. They were both grateful for the visit.        Visited by: Dax Glover  To oneida lópez: Tex Freeman  ([phone])

## 2022-11-23 NOTE — PROGRESS NOTES
Spiritual Care Partner Volunteer visited patient at [address] in [facility] 3 SURG TELE on 11/23/2022   Documented by: Osbaldo Oviedo  To oneida : [phone]  ([phone])

## 2022-11-24 VITALS
BODY MASS INDEX: 30.22 KG/M2 | RESPIRATION RATE: 18 BRPM | HEIGHT: 66 IN | WEIGHT: 188.05 LBS | SYSTOLIC BLOOD PRESSURE: 159 MMHG | HEART RATE: 67 BPM | TEMPERATURE: 97.3 F | DIASTOLIC BLOOD PRESSURE: 91 MMHG | OXYGEN SATURATION: 97 %

## 2022-11-24 LAB
ANION GAP SERPL CALC-SCNC: 3 MMOL/L (ref 5–15)
BASOPHILS # BLD: 0 K/UL (ref 0–0.1)
BASOPHILS NFR BLD: 0 % (ref 0–1)
BUN SERPL-MCNC: 10 MG/DL (ref 6–20)
BUN/CREAT SERPL: 14 (ref 12–20)
CALCIUM SERPL-MCNC: 8.5 MG/DL (ref 8.5–10.1)
CHLORIDE SERPL-SCNC: 105 MMOL/L (ref 97–108)
CO2 SERPL-SCNC: 28 MMOL/L (ref 21–32)
CREAT SERPL-MCNC: 0.74 MG/DL (ref 0.55–1.02)
DIFFERENTIAL METHOD BLD: ABNORMAL
EOSINOPHIL # BLD: 0 K/UL (ref 0–0.4)
EOSINOPHIL NFR BLD: 0 % (ref 0–7)
ERYTHROCYTE [DISTWIDTH] IN BLOOD BY AUTOMATED COUNT: 12.1 % (ref 11.5–14.5)
GLUCOSE SERPL-MCNC: 102 MG/DL (ref 65–100)
HCT VFR BLD AUTO: 34.6 % (ref 35–47)
HGB BLD-MCNC: 11.5 G/DL (ref 11.5–16)
IMM GRANULOCYTES # BLD AUTO: 0 K/UL (ref 0–0.04)
IMM GRANULOCYTES NFR BLD AUTO: 0 % (ref 0–0.5)
LYMPHOCYTES # BLD: 2.1 K/UL (ref 0.8–3.5)
LYMPHOCYTES NFR BLD: 30 % (ref 12–49)
MCH RBC QN AUTO: 31.9 PG (ref 26–34)
MCHC RBC AUTO-ENTMCNC: 33.2 G/DL (ref 30–36.5)
MCV RBC AUTO: 95.8 FL (ref 80–99)
MONOCYTES # BLD: 0.6 K/UL (ref 0–1)
MONOCYTES NFR BLD: 8 % (ref 5–13)
NEUTS SEG # BLD: 4.3 K/UL (ref 1.8–8)
NEUTS SEG NFR BLD: 62 % (ref 32–75)
NRBC # BLD: 0 K/UL (ref 0–0.01)
NRBC BLD-RTO: 0 PER 100 WBC
PLATELET # BLD AUTO: 205 K/UL (ref 150–400)
PMV BLD AUTO: 9.8 FL (ref 8.9–12.9)
POTASSIUM SERPL-SCNC: 3.5 MMOL/L (ref 3.5–5.1)
RBC # BLD AUTO: 3.61 M/UL (ref 3.8–5.2)
SODIUM SERPL-SCNC: 136 MMOL/L (ref 136–145)
WBC # BLD AUTO: 7.1 K/UL (ref 3.6–11)

## 2022-11-24 PROCEDURE — 85025 COMPLETE CBC W/AUTO DIFF WBC: CPT

## 2022-11-24 PROCEDURE — 74011250636 HC RX REV CODE- 250/636: Performed by: SURGERY

## 2022-11-24 PROCEDURE — 74011250637 HC RX REV CODE- 250/637: Performed by: SURGERY

## 2022-11-24 PROCEDURE — 80048 BASIC METABOLIC PNL TOTAL CA: CPT

## 2022-11-24 PROCEDURE — 36415 COLL VENOUS BLD VENIPUNCTURE: CPT

## 2022-11-24 PROCEDURE — 74011000250 HC RX REV CODE- 250: Performed by: SURGERY

## 2022-11-24 RX ADMIN — Medication 1 AMPULE: at 06:27

## 2022-11-24 RX ADMIN — SODIUM CHLORIDE, POTASSIUM CHLORIDE, SODIUM LACTATE AND CALCIUM CHLORIDE 75 ML/HR: 600; 310; 30; 20 INJECTION, SOLUTION INTRAVENOUS at 03:12

## 2022-11-24 RX ADMIN — SODIUM CHLORIDE, PRESERVATIVE FREE 10 ML: 5 INJECTION INTRAVENOUS at 06:27

## 2022-11-24 RX ADMIN — HYDROCHLOROTHIAZIDE 12.5 MG: 25 TABLET ORAL at 09:08

## 2022-11-24 RX ADMIN — KETOROLAC TROMETHAMINE 15 MG: 30 INJECTION, SOLUTION INTRAMUSCULAR at 06:20

## 2022-11-24 RX ADMIN — ENOXAPARIN SODIUM 40 MG: 100 INJECTION SUBCUTANEOUS at 09:08

## 2022-11-24 RX ADMIN — ACETAMINOPHEN 650 MG: 325 TABLET ORAL at 09:08

## 2022-11-24 RX ADMIN — ACETAMINOPHEN 650 MG: 325 TABLET ORAL at 03:12

## 2022-11-24 RX ADMIN — OXYCODONE 10 MG: 5 TABLET ORAL at 06:20

## 2022-11-24 RX ADMIN — OXYCODONE 10 MG: 5 TABLET ORAL at 00:21

## 2022-11-24 NOTE — PROGRESS NOTES
End of Shift Note    Bedside shift change report given to SARI Merrill (oncoming nurse) by Angelo Grewal RN (offgoing nurse). Report included the following information SBAR, Kardex, OR Summary, Procedure Summary, Intake/Output, MAR, and Recent Results    Shift worked:  4595-447 am     Shift summary and any significant changes:     Patient complained of abdominal pressure/discomfort and pain. Patient stated that yesterday (11/23/22) was a \"horrible day because of all the pressure and gas I have\". MD was notified and Maalox was ordered PRN, however patient did not take the Maalox because she stated that she was able to \"pass a lot of gas\" after laying on her left side. Patient was also administered pain medication x3. Patient abdominal pressure/discomfort and pain has significantly improved this morning. Patient 4 trocar sites are dry and intact. Patient has more swelling on the lower right quadrant. Ice packs along with pain medications were administered.           Concerns for physician to address:  None     Zone phone for oncoming shift:   [phone]           Length of Stay:  Expected LOS: 3d 7h  Actual LOS: 2      Angelo Grewal RN

## 2022-11-24 NOTE — DISCHARGE INSTRUCTIONS
Chloe Machado MD, FACS  Jesse Barry MD, FACS  Andrea Cantrell. Steve Donovan MD, [address] [facility] Erin Jones MD, [address] [facility] Ashley Scott MD, FACS  Donato Shah. MD Nelda Zamarripa MD    [facility] Discharge Instructions for Colon Surgery Patients    Diagnosis: diverticulitis  Restricted fiber diet. Do not drive for 2 weeks or until after your next doctors appointment. Leave the waterproof glue on incision. It may fall off on its own. May take a shower. No lifting any objects weighing more than 15 pounds. Do not do any housework, such as vacuuming, scrubbing, etc for at least a month. When you get tired during the day, take naps, as you need your rest.  Multiple bowel movements are normal each day for a while. May walk as desired. May go up and down stairs. Take pain medication as prescribed: (NO DRIVING WHILE ON PAIN MEDICATIONS). Oxycodone EVERY 4-6 HOURS AS NEEDED. Other Medications: alternate tylenol and ibuprofen   See me in the office as scheduled   Call the Exchange [phone], if you have any questions or problems after office hours.             Kirill Orr [address], [address]  [place]  [phone] · Fax [phone] Right [address]  Henrique Alvarez  [phone] · Fax [phone] Kaylin Khan, [address]  [place], [address]  [phone] · Fax [phone]

## 2022-11-24 NOTE — DISCHARGE SUMMARY
Admit date: 11/22/2022   Admitting Provider: Aftab Teran MD    Discharge date: 11/24/2022  Discharging Provider: Shanon Whiteside MD      * Admission Diagnoses: Diverticulitis [K57.92]    * Discharge Diagnoses:    Hospital Problems as of 11/24/2022 Date Reviewed: 6/6/2022            Codes Class Noted - Resolved POA    Diverticulitis ICD-10-CM: M11.65  ICD-9-CM: 562.11  11/22/2022 - Present Unknown           * Hospital Course: Underwent robotic sigmoid colectomy for diverticulitis on 11/22/22. Recovered well postoperatively. Had return of bowel function POD1. Tolerating low fiber diet. Ambulating. Voiding on own. Pain controlled on PO meds. * Procedures:   Procedure(s):  ROBOTIC ASSISTED LAPAROSCOPIC SIGMOID COLECTOMY WITH INTRAOPERATIVE FLEXIBLE SIGMOIDOSCOPY, CYSTOSCOPY, INSERTION OF BILATERAL URETERAL CATHETERS (E R A S) (UROLOGY TO POST)  . Consults: None        Discharge Exam:  Visit Vitals  /82 (BP 1 Location: Right upper arm, BP Patient Position: At rest;Semi fowlers)   Pulse 61   Temp 98.1 °F (36.7 °C)   Resp 16   Ht 5' 6\" (1.676 m)   Wt 85.3 kg (188 lb 0.8 oz)   SpO2 96%   Breastfeeding No   BMI 30.35 kg/m²     General:  Alert, cooperative, no distress, appears stated age. Head:  Normocephalic, without obvious abnormality, atraumatic. Eyes:  Conjunctivae/corneas clear. PERRL, EOMs intact. Fundi benign. Ears:  Normal TMs and external ear canals both ears. Nose: Nares normal. Septum midline. Mucosa normal. No drainage or sinus tenderness. Throat: Lips, mucosa, and tongue normal. Teeth and gums normal.   Neck: Supple, symmetrical, trachea midline, no adenopathy, thyroid: no enlargement/tenderness/nodules, no carotid bruit and no JVD. Back:   Symmetric, no curvature. ROM normal. No CVA tenderness. Lungs:   Clear to auscultation bilaterally. Chest wall:  No tenderness or deformity. Heart:  Regular rate and rhythm, S1, S2 normal, no murmur, click, rub or gallop.    Breast Exam: No tenderness, masses, or nipple abnormality. Abdomen:    Soft, nondistended, appropriately tender, small amount of ecchymosis at incisions    Extremities: Extremities normal, atraumatic, no cyanosis or edema. Pulses: 2+ and symmetric all extremities. Skin: Skin color, texture, turgor normal. No rashes or lesions. Lymph nodes: Cervical, supraclavicular, and axillary nodes normal.   Neurologic: CNII-XII intact. Normal strength, sensation and reflexes throughout. * Discharge Condition: good  * Disposition: Home    Discharge Medications:  Current Discharge Medication List          * Follow-up Care/Patient Instructions:   Activity: No lifting, Driving, or Strenuous exercise   Diet: low fiber diet  Wound Care: shower like normal, no soaking in a tub    Follow-up Information    None         Signed:  Alice Arzola MD  11/24/2022  7:57 AM

## 2022-12-06 NOTE — PROGRESS NOTES
HISTORY OF PRESENT ILLNESS  Shorty Bentley is a 58 y.o. female. HPI  Last here 6/6/22. Pt is here for routine care. Has a history of hypertension  BP today is 130/78  BP in the hospital for diverticulitis 159/91, 132/82   No home BP readings for review  Taking hctz 12.5mg  She self dc'd losartan 50 mg a long time ago due to dizzy spells      Wt today is 187 lbs, down 15 lbs since lov   Discussed diet and wt/l, she is still doing [employer] pt on wt/l      Reviewed labs    Ordered pre-labs    She saw Dr. Alexis Abel 7/22 (?) for hematochezia and LLQ pain   Likely had a colonoscopy, she is not sure where   Reviewed CT abd pelv 7/25/22:  Impression:     Wall thickening mid sigmoid colon with mild associated inflammatory changes most   compatible with minimal acute or resolving sigmoid diverticulitis. However,   underlying neoplasm cannot entirely be excluded and follow-up is recommended to   ensure resolution. She had a robotic sigmoid colectomy for diverticulitis on 11/22/22 by Dr. Omkar Izquierdo (colorectal)  Last f/U w/ him 12/5/22, all is healing well   Endorses normal BM's. Will f/U PRN  Reviewed CXR 11/10/22: IMPRESSION  No acute cardiopulmonary disease.     Pt previously followed with Dr. Nhi Byers (ortho) for neck pain  No longer needed to see him   Pt also follows with Dr. Meera Marvin (ortho) for neck pain  Last visit was 8/3/17   Pt received injections at that time     Pt saw Dr. Shahid Bills (rheum) -- was sent by marita for fibromyalgia 4/17/19 , the patient states she also was told she had RA?, pt states that she has borderline RA  Was there in October 2019  Not currently taking plaquenil 200mg bid  Not planning on following up, discussed following up if she has more joint pain for now she is not interested     No longer taking sreekanth or meloxicam  Myalgias are much improved, doing quite well now    She is taking vit D OTC     Saw shannon joseph (derm) in the past discussed following up for routine skin check     ACP not on file. SDM is her . Provided information in the past.       PREVENTIVE:    Colonoscopy:  Dr. Sharmaine Bronson, diverticulitis, will get report  Pap: [facility], , declines  Mammogram: [facility], 18, negative--declines  DEXA: 8/10/16, nl, declines  Tdap:   Pneumovax: not yet needed  [id number]: not yet needed  Shingrix: 1st dose 21 2nd dose 22  Flu shot: declines    Eye exam:  every other year  A1C:  5.6  5.7 10/20 5.6  5.5  5.8  5.6  Hep C screen: , negative  Lipids:    EK/16, nsr  Covid: 2 doses (Pfizer) +  Moderna booster    Patient Active Problem List    Diagnosis Date Noted    Diverticulitis 2022    RA (rheumatoid arthritis) ([facility] [address] 04/10/2020    Mixed incontinence 2013    Rectocele 2013    Obesity 2013    HTN (hypertension) 12/15/2009    Chronic neck pain 12/15/2009    DJD (degenerative joint disease) 12/15/2009    Vitamin D deficiency 12/15/2009    Fibrocystic disease of breast 12/15/2009     Current Outpatient Medications   Medication Sig Dispense Refill    hydroCHLOROthiazide (HYDRODIURIL) 12.5 mg tablet TAKE 1 TABLET BY MOUTH EVERY DAY 30 Tablet 0    cholecalciferol, vitamin D3, 50 mcg (2,000 unit) tab Take 1 Tablet by mouth daily.        Past Surgical History:   Procedure Laterality Date    HX AMPUTATION FINGER      left index    HX CYST REMOVAL  2015    from jaw and back     HX CYST REMOVAL  2015    from finger    HX HYSTERECTOMY      partial    HX ORTHOPAEDIC      right thumb fracture repair      Lab Results   Component Value Date/Time    WBC 7.1 2022 03:43 AM    HGB 11.5 2022 03:43 AM    HCT 34.6 (L) 2022 03:43 AM    PLATELET 651  03:43 AM    MCV 95.8 2022 03:43 AM     Lab Results   Component Value Date/Time    Cholesterol, total 202 (H) 2022 09:52 AM    HDL Cholesterol 46 2022 09:52 AM    LDL, calculated 111.8 (H) 2022 09:52 AM    Triglyceride 221 (H) 06/06/2022 09:52 AM    CHOL/HDL Ratio 4.4 06/06/2022 09:52 AM     Lab Results   Component Value Date/Time    GFR est non-AA 58 (L) 06/06/2022 09:52 AM    GFR est AA >60 06/06/2022 09:52 AM    Creatinine 0.74 11/24/2022 03:43 AM    BUN 10 11/24/2022 03:43 AM    Sodium 136 11/24/2022 03:43 AM    Potassium 3.5 11/24/2022 03:43 AM    Chloride 105 11/24/2022 03:43 AM    CO2 28 11/24/2022 03:43 AM    Magnesium 2.4 11/10/2022 03:15 PM    Phosphorus 3.0 11/10/2022 03:15 PM    PTH, Intact 28 03/01/2019 11:47 AM        Review of Systems   Respiratory:  Negative for shortness of breath. Cardiovascular:  Negative for chest pain. Physical Exam  Constitutional:       General: She is not in acute distress. Appearance: She is not ill-appearing, toxic-appearing or diaphoretic. HENT:      Head: Normocephalic and atraumatic. Right Ear: Tympanic membrane, ear canal and external ear normal.      Left Ear: Tympanic membrane, ear canal and external ear normal.   Eyes:      General:         Right eye: No discharge. Left eye: No discharge. Conjunctiva/sclera: Conjunctivae normal.      Pupils: Pupils are equal, round, and reactive to light. Cardiovascular:      Rate and Rhythm: Normal rate and regular rhythm. Heart sounds: No murmur heard. No friction rub. No gallop. Pulmonary:      Effort: No respiratory distress. Breath sounds: Normal breath sounds. No wheezing or rales. Chest:      Chest wall: No tenderness. Abdominal:      Palpations: Abdomen is soft. Tenderness: There is no abdominal tenderness. Musculoskeletal:         General: Normal range of motion. Cervical back: Normal.   Skin:     General: Skin is warm and dry. Comments: Healing trochar incision sites, some bruising   Neurological:      Mental Status: She is oriented to person, place, and time. Mental status is at baseline.       Coordination: Coordination normal.      Gait: Gait normal.   Psychiatric:         Mood and Affect: Mood normal.         Behavior: Behavior normal.       ASSESSMENT and PLAN    ICD-10-CM ICD-9-CM    1. Physical exam  Z00.00 V70.9 LIPID PANEL      METABOLIC PANEL, COMPREHENSIVE   Weight improved she is doing weight watchers working on weight loss    Ordered Pree labs for next time    Reviewed recent labs that are up-to-date    Shingles vaccine up-to-date declines flu shot    Declines mammogram declines pelvic exam colonoscopy was just done we will get report for review    Discussed getting updated COVID shot   HEMOGLOBIN A1C WITH EAG      TSH 3RD GENERATION      CBC W/O DIFF      2. Primary hypertension  I10 401.9 LIPID PANEL      METABOLIC PANEL, COMPREHENSIVE      HEMOGLOBIN A1C WITH EAG   Controlled on hydrochlorothiazide   TSH 3RD GENERATION      CBC W/O DIFF      3. Rheumatoid arthritis involving multiple sites with positive rheumatoid factor (HCC)  M05.79 714.0 LIPID PANEL      METABOLIC PANEL, COMPREHENSIVE   Not interested in further evaluation at this time   HEMOGLOBIN A1C WITH EAG      TSH 3RD GENERATION      CBC W/O DIFF      4. Diverticulitis  K57.92 562.11 LIPID PANEL      METABOLIC PANEL, COMPREHENSIVE   Status post sigmoid colectomy doing well   HEMOGLOBIN A1C WITH EAG      TSH 3RD GENERATION      CBC W/O DIFF        Depression screen reviewed and negative. Scribed by Farheen Caldwell, as dictated by Dr. Manuel Nicole. Current diagnosis and concerns discussed with pt at length. Pt understands risks and benefits or current treatment plan and medications, and accepts the treatment and medication with any possible risks. Pt asks appropriate questions, which were answered. Pt was instructed to call with any concerns or problems. I have reviewed the note documented by the scribe. The services provided are my own. The documentation is accurate.

## 2022-12-12 ENCOUNTER — OFFICE VISIT (OUTPATIENT)
Dept: INTERNAL MEDICINE CLINIC | Age: 62
End: 2022-12-12
Payer: COMMERCIAL

## 2022-12-12 VITALS
DIASTOLIC BLOOD PRESSURE: 78 MMHG | RESPIRATION RATE: 16 BRPM | OXYGEN SATURATION: 99 % | SYSTOLIC BLOOD PRESSURE: 130 MMHG | WEIGHT: 187 LBS | HEART RATE: 61 BPM | HEIGHT: 66 IN | TEMPERATURE: 97.9 F | BODY MASS INDEX: 30.05 KG/M2

## 2022-12-12 DIAGNOSIS — K57.92 DIVERTICULITIS: ICD-10-CM

## 2022-12-12 DIAGNOSIS — Z00.00 PHYSICAL EXAM: Primary | ICD-10-CM

## 2022-12-12 DIAGNOSIS — M05.79 RHEUMATOID ARTHRITIS INVOLVING MULTIPLE SITES WITH POSITIVE RHEUMATOID FACTOR (HCC): ICD-10-CM

## 2022-12-12 DIAGNOSIS — I10 PRIMARY HYPERTENSION: ICD-10-CM

## 2022-12-12 PROCEDURE — 99396 PREV VISIT EST AGE 40-64: CPT | Performed by: INTERNAL MEDICINE

## 2022-12-12 NOTE — PROGRESS NOTES
1. \"Have you been to the ER, urgent care clinic since your last visit? Hospitalized since your last visit? \" No    2. \"Have you seen or consulted any other health care providers outside of the [address] [place] [address] since your last visit? \" No     3. For patients aged 39-70: Has the patient had a colonoscopy / FIT/ Cologuard? Yes - Care Gap present. Most recent result on file      If the patient is female:    4. For patients aged 41-77: Has the patient had a mammogram within the past 2 years? No      5. For patients aged 21-65: Has the patient had a pap smear?  No

## 2022-12-12 NOTE — LETTER
12/12/2022 3:16 PM    Ms. Valentina Bermudez  [address]  [address] [phone]    Dear Care Provider    Please fax us the most recent colonoscopy report so that we may update the patient's records for continuity of care. Our fax number: [phone].     Patient:   Valentina Bermudez  1960          Sincerely,      Avis Marcial MD

## 2022-12-12 NOTE — LETTER
12/12/2022 3:15 PM    Ms. Horace Councilman  [address]  [address] [phone]    Dear Care Provider    Please fax us the most recent notes so that we may update the patient's records for continuity of care. Our fax number: [phone].     Patient:   Horace Councilman  1960            Sincerely,      Ricardo Mayer MD

## 2023-01-17 NOTE — PROGRESS NOTES
HISTORY OF PRESENT ILLNESS  Irina Fulton is a 58 y.o. female. HPI  Last here 12/12/22. Pt is here for routine care. Pt c/o progressive R shoulder pain and tenderness x 2 months (since before her episode of diverticulitis in 11/22) that has worsened in the past 2 weeks. Notes she sometimes has tingling down her arm as well. She has tried taking a CBD gummy but not taking tylenol or ibuprofen. Denies acute injury. On exam: she has decreased ROM (only 45 degrees with AB-duction)  Ordered XR R shoulder. Referred to Dr. Sacha Tompkins (ortho shoulder). Discussed she might need an MRI.  Rx'd meloxicam.     Has a history of hypertension  BP today is 125/78  No home BP readings for review  Taking hctz 12.5mg  She self dc'd losartan 50 mg a long time ago due to dizzy spells      Wt today is 187 lbs, stable since lov  Discussed diet and wt/l, she is still doing [employer]     Reviewed labs    Ordered pre-labs     She saw Dr. Liza Contreras 7/22 (?) for hematochezia and LLQ pain   Likely had a colonoscopy, she is not sure where   She had a robotic sigmoid colectomy for diverticulitis on 11/22/22 by Dr. Kya Galindo (colorectal)  Last f/U w/ him 12/5/22, all is healing well   Endorses normal BM's  Will f/U PRN     Pt previously followed with Dr. Saravanan Payton (ortho) for neck pain  No longer needed to see him   Pt also follows with Dr. Lena Bolton (ortho) for neck pain  Last visit was 8/3/17   Pt received injections at that time     Pt saw Dr. Diane Cardoza (rheum) -- was sent by marita for fibromyalgia 4/17/19 , the patient states she also was told she had RA?, pt states that she has borderline RA  Was there in October 2019  Not currently taking plaquenil 200mg BID  Not planning on following up, discussed following up if she has more joint pain for now she is not interested     No longer taking sreekanth or meloxicam  Myalgias are much improved, doing quite well now     She is taking vit D OTC     Saw shannon joseph (derm) in the past discussed following up for routine skin check     ACP not on file. SDM is her . Provided information in the past.       PREVENTIVE:    Colonoscopy:  Dr. Ulysses Abbott, diverticulitis, will get report  Pap: [facility], , declines  Mammogram: [facility], 18, negative--declines  DEXA: 8/10/16, nl, declines  Tdap:   Pneumovax: not yet needed  [id number]: not yet needed  Shingrix: 1st dose 21 2nd dose 22  Flu shot: declines    Eye exam:  every other year  A1C:  5.6  5.7 10/20 5.6  5.5  5.8  5.6  Hep C screen: , negative  Lipids:    EK/16, nsr  Covid: 2 doses (Pfizer) +  Moderna booster    Patient Active Problem List    Diagnosis Date Noted    Diverticulitis 2022    RA (rheumatoid arthritis) ([facility] [address] 04/10/2020    Mixed incontinence 2013    Rectocele 2013    Obesity 2013    HTN (hypertension) 12/15/2009    Chronic neck pain 12/15/2009    DJD (degenerative joint disease) 12/15/2009    Vitamin D deficiency 12/15/2009    Fibrocystic disease of breast 12/15/2009     Current Outpatient Medications   Medication Sig Dispense Refill    hydroCHLOROthiazide (HYDRODIURIL) 12.5 mg tablet TAKE 1 TABLET BY MOUTH EVERY DAY 90 Tablet 0    cholecalciferol, vitamin D3, 50 mcg (2,000 unit) tab Take 1 Tablet by mouth daily.        Past Surgical History:   Procedure Laterality Date    HX AMPUTATION FINGER      left index    HX CYST REMOVAL  2015    from jaw and back     HX CYST REMOVAL  2015    from finger    HX HYSTERECTOMY      partial    HX ORTHOPAEDIC      right thumb fracture repair    HX PARTIAL COLECTOMY Left 2022      Lab Results   Component Value Date/Time    WBC 7.1 2022 03:43 AM    HGB 11.5 2022 03:43 AM    HCT 34.6 (L) 2022 03:43 AM    PLATELET 808  03:43 AM    MCV 95.8 2022 03:43 AM     Lab Results   Component Value Date/Time    Cholesterol, total 202 (H) 2022 09:52 AM    HDL Cholesterol 46 2022 09:52 AM    LDL, calculated 111.8 (H) 06/06/2022 09:52 AM    Triglyceride 221 (H) 06/06/2022 09:52 AM    CHOL/HDL Ratio 4.4 06/06/2022 09:52 AM     Lab Results   Component Value Date/Time    GFR est non-AA 58 (L) 06/06/2022 09:52 AM    GFR est AA >60 06/06/2022 09:52 AM    Creatinine 0.74 11/24/2022 03:43 AM    BUN 10 11/24/2022 03:43 AM    Sodium 136 11/24/2022 03:43 AM    Potassium 3.5 11/24/2022 03:43 AM    Chloride 105 11/24/2022 03:43 AM    CO2 28 11/24/2022 03:43 AM    Magnesium 2.4 11/10/2022 03:15 PM    Phosphorus 3.0 11/10/2022 03:15 PM    PTH, Intact 28 03/01/2019 11:47 AM        Review of Systems   Respiratory:  Negative for shortness of breath. Cardiovascular:  Negative for chest pain. Physical Exam  Constitutional:       General: She is not in acute distress. Appearance: She is not ill-appearing, toxic-appearing or diaphoretic. HENT:      Head: Normocephalic and atraumatic. Right Ear: External ear normal.      Left Ear: External ear normal.   Eyes:      General:         Right eye: No discharge. Left eye: No discharge. Conjunctiva/sclera: Conjunctivae normal.      Pupils: Pupils are equal, round, and reactive to light. Cardiovascular:      Rate and Rhythm: Normal rate and regular rhythm. Heart sounds: No murmur heard. No friction rub. No gallop. Pulmonary:      Effort: No respiratory distress. Breath sounds: Normal breath sounds. No wheezing or rales. Chest:      Chest wall: No tenderness. Musculoskeletal:         General: Tenderness (over posterior aspect of R shoulder) present. Cervical back: Normal.      Comments: Decreased ROM (only 45 degrees w/ ABduction) on R shoulder  R arm decreased strength compared to L arm, but still overall 5/5 strength upper extremities   Skin:     General: Skin is warm and dry. Neurological:      Mental Status: She is oriented to person, place, and time. Mental status is at baseline.       Coordination: Coordination normal.      Gait: Gait normal.   Psychiatric:         Mood and Affect: Mood normal.         Behavior: Behavior normal.       ASSESSMENT and PLAN    ICD-10-CM ICD-9-CM    1. Acute pain of right shoulder  M25.511 719.41 XR SHOULDER RT AP/LAT MIN 2 V      REFERRAL TO ORTHOPEDICS   Patient with pain and significant decreased range of motion of the right shoulder very limited abduction    Pain is acutely in the posterior aspect of the right shoulder    We will get plain film    Will give meloxicam and gabapentin for symptoms she does note some radicular symptoms but this really seems more related to the shoulder than her    Will refer to Ortho shoulder due to severe limited range of motion of the extremity   gabapentin (NEURONTIN) 100 mg capsule      2. Primary hypertension  I10 401.9    Well-controlled hydrochlorothiazide continue no change to dose   Depression screen reviewed and negative. Scribed by Rusty Roy, as dictated by Dr. Jonny Elmore. Current diagnosis and concerns discussed with pt at length. Pt understands risks and benefits or current treatment plan and medications, and accepts the treatment and medication with any possible risks. Pt asks appropriate questions, which were answered. Pt was instructed to call with any concerns or problems. I have reviewed the note documented by the scribe. The services provided are my own. The documentation is accurate.

## 2023-01-18 ENCOUNTER — HOSPITAL ENCOUNTER (OUTPATIENT)
Dept: GENERAL RADIOLOGY | Age: 63
Discharge: HOME | End: 2023-01-18
Payer: COMMERCIAL

## 2023-01-18 ENCOUNTER — OFFICE VISIT (OUTPATIENT)
Dept: INTERNAL MEDICINE CLINIC | Age: 63
End: 2023-01-18
Payer: COMMERCIAL

## 2023-01-18 VITALS
HEIGHT: 66 IN | WEIGHT: 187 LBS | HEART RATE: 69 BPM | BODY MASS INDEX: 30.05 KG/M2 | OXYGEN SATURATION: 97 % | TEMPERATURE: 98.2 F | RESPIRATION RATE: 16 BRPM | SYSTOLIC BLOOD PRESSURE: 125 MMHG | DIASTOLIC BLOOD PRESSURE: 78 MMHG

## 2023-01-18 DIAGNOSIS — M25.511 ACUTE PAIN OF RIGHT SHOULDER: ICD-10-CM

## 2023-01-18 DIAGNOSIS — I10 PRIMARY HYPERTENSION: ICD-10-CM

## 2023-01-18 DIAGNOSIS — M25.511 ACUTE PAIN OF RIGHT SHOULDER: Primary | ICD-10-CM

## 2023-01-18 PROCEDURE — 99213 OFFICE O/P EST LOW 20 MIN: CPT | Performed by: INTERNAL MEDICINE

## 2023-01-18 PROCEDURE — 73030 X-RAY EXAM OF SHOULDER: CPT

## 2023-01-18 RX ORDER — GABAPENTIN 100 MG/1
100 CAPSULE ORAL
Qty: 30 CAPSULE | Refills: 0 | Status: SHIPPED | OUTPATIENT
Start: 2023-01-18

## 2023-01-18 RX ORDER — MELOXICAM 15 MG/1
15 TABLET ORAL DAILY
Qty: 30 TABLET | Refills: 1 | Status: SHIPPED | OUTPATIENT
Start: 2023-01-18

## 2023-01-18 NOTE — PROGRESS NOTES
1. \"Have you been to the ER, urgent care clinic since your last visit? Hospitalized since your last visit? \" No    2. \"Have you seen or consulted any other health care providers outside of the [address] [place] [address] since your last visit? \" No     3. For patients aged 39-70: Has the patient had a colonoscopy / FIT/ Cologuard? Yes - no Care Gap present      If the patient is female:    4. For patients aged 41-77: Has the patient had a mammogram within the past 2 years? No      5. For patients aged 21-65: Has the patient had a pap smear?  Yes - no Care Gap present

## 2023-01-19 NOTE — PROGRESS NOTES
Result letter mailed to patient.
Send letter
Verbal/written post procedure instructions were given to patient/caregiver

## 2023-02-03 ENCOUNTER — VIRTUAL VISIT (OUTPATIENT)
Dept: INTERNAL MEDICINE CLINIC | Age: 63
End: 2023-02-03
Payer: COMMERCIAL

## 2023-02-03 DIAGNOSIS — J02.9 PHARYNGITIS, UNSPECIFIED ETIOLOGY: Primary | ICD-10-CM

## 2023-02-03 DIAGNOSIS — I10 PRIMARY HYPERTENSION: ICD-10-CM

## 2023-02-03 RX ORDER — AMOXICILLIN AND CLAVULANATE POTASSIUM 875; 125 MG/1; MG/1
1 TABLET, FILM COATED ORAL EVERY 12 HOURS
Qty: 14 TABLET | Refills: 0 | Status: SHIPPED | OUTPATIENT
Start: 2023-02-03 | End: 2023-02-10

## 2023-02-03 NOTE — PROGRESS NOTES
1. \"Have you been to the ER, urgent care clinic since your last visit? Hospitalized since your last visit? \" No    2. \"Have you seen or consulted any other health care providers outside of the [address] [place] [address] since your last visit? \" No     3. For patients aged 39-70: Has the patient had a colonoscopy / FIT/ Cologuard? No      If the patient is female:    4. For patients aged 41-77: Has the patient had a mammogram within the past 2 years? No      5. For patients aged 21-65: Has the patient had a pap smear? Yes - Care Gap present.  Most recent result on file

## 2023-02-03 NOTE — PROGRESS NOTES
HISTORY OF PRESENT ILLNESS  Roseline Dunham is a 58 y.o. female. HPI  Last here vv 1/18/23. Here for acute care. This is an established visit completed with telemedicine was completed with video assist. The patient acknowledges and agrees to this method of visitation. Pt c/o L sided sore throat, L ear pain, runny nose x 3 days that has stayed constant since onset. States she felt feverish 2 days ago but not currently. Reports her  has similar sx's (scratchy, itchy throat). Denies any pus on her throat. Denies cough, SOB, wheezing. Took a covid test 2 days ago, which was negative. On exam (via photo sent by pt): Papule/exudate  on L side of throat  Advised pt to take one more covid test. Rx'd augmentin. Discussed she could come in for a strep test though the antibiotics would likely cover her regardless. Recommended flonase. Entered blood pressure showing good control on hydrochlorothiazide    Patient Active Problem List    Diagnosis Date Noted    Diverticulitis 11/22/2022    RA (rheumatoid arthritis) ([facility] [address] 04/10/2020    Mixed incontinence 09/06/2013    Rectocele 09/06/2013    Obesity 02/12/2013    HTN (hypertension) 12/15/2009    Chronic neck pain 12/15/2009    DJD (degenerative joint disease) 12/15/2009    Vitamin D deficiency 12/15/2009    Fibrocystic disease of breast 12/15/2009     Current Outpatient Medications   Medication Sig Dispense Refill    meloxicam (MOBIC) 15 mg tablet Take 1 Tablet by mouth daily. 30 Tablet 1    gabapentin (NEURONTIN) 100 mg capsule Take 1 Capsule by mouth nightly as needed for Pain. Max Daily Amount: 100 mg. 30 Capsule 0    hydroCHLOROthiazide (HYDRODIURIL) 12.5 mg tablet TAKE 1 TABLET BY MOUTH EVERY DAY 90 Tablet 0    cholecalciferol, vitamin D3, 50 mcg (2,000 unit) tab Take 1 Tablet by mouth daily.        Past Surgical History:   Procedure Laterality Date    HX AMPUTATION FINGER      left index    HX CYST REMOVAL  02/2015    from jaw and back     HX CYST REMOVAL 02/2015    from finger    HX HYSTERECTOMY      partial    HX ORTHOPAEDIC      right thumb fracture repair    HX PARTIAL COLECTOMY Left 11/22/2022      Lab Results   Component Value Date/Time    WBC 7.1 11/24/2022 03:43 AM    HGB 11.5 11/24/2022 03:43 AM    HCT 34.6 (L) 11/24/2022 03:43 AM    PLATELET 286 12/26/4263 03:43 AM    MCV 95.8 11/24/2022 03:43 AM     Lab Results   Component Value Date/Time    Cholesterol, total 202 (H) 06/06/2022 09:52 AM    HDL Cholesterol 46 06/06/2022 09:52 AM    LDL, calculated 111.8 (H) 06/06/2022 09:52 AM    Triglyceride 221 (H) 06/06/2022 09:52 AM    CHOL/HDL Ratio 4.4 06/06/2022 09:52 AM     Lab Results   Component Value Date/Time    GFR est non-AA 58 (L) 06/06/2022 09:52 AM    GFR est AA >60 06/06/2022 09:52 AM    Creatinine 0.74 11/24/2022 03:43 AM    BUN 10 11/24/2022 03:43 AM    Sodium 136 11/24/2022 03:43 AM    Potassium 3.5 11/24/2022 03:43 AM    Chloride 105 11/24/2022 03:43 AM    CO2 28 11/24/2022 03:43 AM    Magnesium 2.4 11/10/2022 03:15 PM    Phosphorus 3.0 11/10/2022 03:15 PM    PTH, Intact 28 03/01/2019 11:47 AM        Review of Systems   HENT:  Positive for ear pain (L) and sore throat. Respiratory:  Negative for shortness of breath. Cardiovascular:  Negative for chest pain. Physical Exam  Constitutional:       General: She is not in acute distress. Appearance: Normal appearance. She is not ill-appearing, toxic-appearing or diaphoretic. HENT:      Head: Normocephalic and atraumatic. Eyes:      General:         Right eye: No discharge. Left eye: No discharge. Conjunctiva/sclera: Conjunctivae normal.   Neurological:      General: No focal deficit present. Mental Status: She is alert and oriented to person, place, and time. Psychiatric:         Mood and Affect: Mood normal.         Behavior: Behavior normal.       ASSESSMENT and PLAN    ICD-10-CM ICD-9-CM    1.  Pharyngitis, unspecified etiology  J02.9 462    We will treat with Augmentin twice daily she will take a second COVID test today to make sure it still negative    Discussed coming into the office for strep test she prefers not to at this time it is reasonable to cover her with antibiotics if not improving she will need to come in for further evaluation discussed Flonase for sinus pain and pressure   2. Primary hypertension  I10 401.9    Well-controlled at home on hydrochlorothiazide     Depression screen reviewed and negative. Scribed by Lupe Reyes, as dictated by Dr. Phoenix Lujan. Current diagnosis and concerns discussed with pt at length. Pt understands risks and benefits or current treatment plan and medications, and accepts the treatment and medication with any possible risks. Pt asks appropriate questions, which were answered. Pt was instructed to call with any concerns or problems. I have reviewed the note documented by the scribe. The services provided are my own. The documentation is accurate. Cecile Tang, who was evaluated through a synchronous (real-time) audio-video encounter, and/or her healthcare decision maker, is aware that it is a billable service, which includes applicable co-pays, with coverage as determined by her insurance carrier. She provided verbal consent to proceed and patient identification was verified. This visit was conducted pursuant to the emergency declaration under the [facility], [address] [place] [address] authority and the [employer] and [employer] [place] [employer] Act. A caregiver was present when appropriate. Ability to conduct physical exam was limited.  The patient was located at: Home: [address] [place] [phone]  The provider was located at: Home: Mann Friend on 2/3/2023 at 7:31 AM

## 2023-03-20 RX ORDER — MELOXICAM 15 MG/1
TABLET ORAL
Qty: 30 TABLET | Refills: 1 | Status: SHIPPED | OUTPATIENT
Start: 2023-03-20

## 2023-05-30 ASSESSMENT — ENCOUNTER SYMPTOMS: SHORTNESS OF BREATH: 0

## 2023-06-05 ENCOUNTER — OFFICE VISIT (OUTPATIENT)
Age: 63
End: 2023-06-05
Payer: COMMERCIAL

## 2023-06-05 VITALS
DIASTOLIC BLOOD PRESSURE: 81 MMHG | HEART RATE: 67 BPM | BODY MASS INDEX: 29.96 KG/M2 | TEMPERATURE: 97.9 F | HEIGHT: 66 IN | RESPIRATION RATE: 14 BRPM | SYSTOLIC BLOOD PRESSURE: 126 MMHG | WEIGHT: 186.4 LBS | OXYGEN SATURATION: 97 %

## 2023-06-05 DIAGNOSIS — M15.9 PRIMARY OSTEOARTHRITIS INVOLVING MULTIPLE JOINTS: ICD-10-CM

## 2023-06-05 DIAGNOSIS — Z11.59 ENCOUNTER FOR HEPATITIS C SCREENING TEST FOR LOW RISK PATIENT: ICD-10-CM

## 2023-06-05 DIAGNOSIS — E66.9 OBESITY (BMI 30.0-34.9): ICD-10-CM

## 2023-06-05 DIAGNOSIS — I10 PRIMARY HYPERTENSION: Primary | ICD-10-CM

## 2023-06-05 DIAGNOSIS — M05.79 RHEUMATOID ARTHRITIS INVOLVING MULTIPLE SITES WITH POSITIVE RHEUMATOID FACTOR (HCC): ICD-10-CM

## 2023-06-05 DIAGNOSIS — E55.9 VITAMIN D DEFICIENCY: ICD-10-CM

## 2023-06-05 DIAGNOSIS — Z11.4 ENCOUNTER FOR SCREENING FOR HIV: ICD-10-CM

## 2023-06-05 DIAGNOSIS — R73.01 IFG (IMPAIRED FASTING GLUCOSE): ICD-10-CM

## 2023-06-05 DIAGNOSIS — E78.01 FAMILIAL HYPERCHOLESTEROLEMIA: ICD-10-CM

## 2023-06-05 PROCEDURE — 3074F SYST BP LT 130 MM HG: CPT | Performed by: INTERNAL MEDICINE

## 2023-06-05 PROCEDURE — 99214 OFFICE O/P EST MOD 30 MIN: CPT | Performed by: INTERNAL MEDICINE

## 2023-06-05 PROCEDURE — 3079F DIAST BP 80-89 MM HG: CPT | Performed by: INTERNAL MEDICINE

## 2023-06-05 RX ORDER — M-VIT,TX,IRON,MINS/CALC/FOLIC 27MG-0.4MG
1 TABLET ORAL DAILY
COMMUNITY

## 2023-06-05 SDOH — ECONOMIC STABILITY: FOOD INSECURITY: WITHIN THE PAST 12 MONTHS, THE FOOD YOU BOUGHT JUST DIDN'T LAST AND YOU DIDN'T HAVE MONEY TO GET MORE.: NEVER TRUE

## 2023-06-05 SDOH — ECONOMIC STABILITY: INCOME INSECURITY: HOW HARD IS IT FOR YOU TO PAY FOR THE VERY BASICS LIKE FOOD, HOUSING, MEDICAL CARE, AND HEATING?: NOT HARD AT ALL

## 2023-06-05 SDOH — ECONOMIC STABILITY: FOOD INSECURITY: WITHIN THE PAST 12 MONTHS, YOU WORRIED THAT YOUR FOOD WOULD RUN OUT BEFORE YOU GOT MONEY TO BUY MORE.: NEVER TRUE

## 2023-06-05 SDOH — ECONOMIC STABILITY: HOUSING INSECURITY
IN THE LAST 12 MONTHS, WAS THERE A TIME WHEN YOU DID NOT HAVE A STEADY PLACE TO SLEEP OR SLEPT IN A SHELTER (INCLUDING NOW)?: NO

## 2023-06-05 ASSESSMENT — PATIENT HEALTH QUESTIONNAIRE - PHQ9
SUM OF ALL RESPONSES TO PHQ QUESTIONS 1-9: 0
2. FEELING DOWN, DEPRESSED OR HOPELESS: 0
1. LITTLE INTEREST OR PLEASURE IN DOING THINGS: 0
SUM OF ALL RESPONSES TO PHQ9 QUESTIONS 1 & 2: 0

## 2023-06-05 NOTE — PROGRESS NOTES
1. \"Have you been to the ER, urgent care clinic since your last visit? Hospitalized since your last visit? \" No    2. \"Have you seen or consulted any other health care providers outside of the [address] [place] [address] since your last visit? \" No     3. For patients aged 39-70: Has the patient had a colonoscopy / FIT/ Cologuard? Yes - no Care Gap present      If the patient is female:    4. For patients aged 41-77: Has the patient had a mammogram within the past 2 years? No      5. For patients aged 21-65: Has the patient had a pap smear?  Yes - no Care Gap present

## 2023-06-06 LAB
25(OH)D3 SERPL-MCNC: 31.9 NG/ML (ref 30–100)
ALBUMIN SERPL-MCNC: 4.1 G/DL (ref 3.5–5)
ALBUMIN/GLOB SERPL: 1.1 (ref 1.1–2.2)
ALP SERPL-CCNC: 90 U/L (ref 45–117)
ALT SERPL-CCNC: 40 U/L (ref 12–78)
ANION GAP SERPL CALC-SCNC: 5 MMOL/L (ref 5–15)
AST SERPL-CCNC: 25 U/L (ref 15–37)
BILIRUB SERPL-MCNC: 0.4 MG/DL (ref 0.2–1)
BUN SERPL-MCNC: 13 MG/DL (ref 6–20)
BUN/CREAT SERPL: 14 (ref 12–20)
CALCIUM SERPL-MCNC: 10 MG/DL (ref 8.5–10.1)
CHLORIDE SERPL-SCNC: 104 MMOL/L (ref 97–108)
CHOLEST SERPL-MCNC: 266 MG/DL
CO2 SERPL-SCNC: 30 MMOL/L (ref 21–32)
CREAT SERPL-MCNC: 0.91 MG/DL (ref 0.55–1.02)
ERYTHROCYTE [DISTWIDTH] IN BLOOD BY AUTOMATED COUNT: 12.3 % (ref 11.5–14.5)
EST. AVERAGE GLUCOSE BLD GHB EST-MCNC: 103 MG/DL
GLOBULIN SER CALC-MCNC: 3.7 G/DL (ref 2–4)
GLUCOSE SERPL-MCNC: 108 MG/DL (ref 65–100)
HBA1C MFR BLD: 5.2 % (ref 4–5.6)
HCT VFR BLD AUTO: 45.3 % (ref 35–47)
HCV AB SERPL QL IA: NONREACTIVE
HDLC SERPL-MCNC: 64 MG/DL
HDLC SERPL: 4.2 (ref 0–5)
HGB BLD-MCNC: 14.4 G/DL (ref 11.5–16)
HIV 1+2 AB+HIV1 P24 AG SERPL QL IA: NONREACTIVE
HIV 1/2 RESULT COMMENT: NORMAL
LDLC SERPL CALC-MCNC: 141.2 MG/DL (ref 0–100)
MCH RBC QN AUTO: 31.4 PG (ref 26–34)
MCHC RBC AUTO-ENTMCNC: 31.8 G/DL (ref 30–36.5)
MCV RBC AUTO: 98.7 FL (ref 80–99)
NRBC # BLD: 0 K/UL (ref 0–0.01)
NRBC BLD-RTO: 0 PER 100 WBC
PLATELET # BLD AUTO: 266 K/UL (ref 150–400)
PMV BLD AUTO: 10.7 FL (ref 8.9–12.9)
POTASSIUM SERPL-SCNC: 3.8 MMOL/L (ref 3.5–5.1)
PROT SERPL-MCNC: 7.8 G/DL (ref 6.4–8.2)
RBC # BLD AUTO: 4.59 M/UL (ref 3.8–5.2)
SODIUM SERPL-SCNC: 139 MMOL/L (ref 136–145)
TRIGL SERPL-MCNC: 304 MG/DL
TSH SERPL DL<=0.05 MIU/L-ACNC: 3.75 UIU/ML (ref 0.36–3.74)
VLDLC SERPL CALC-MCNC: 60.8 MG/DL
WBC # BLD AUTO: 5.2 K/UL (ref 3.6–11)

## 2023-06-07 LAB — T4 FREE SERPL-MCNC: 0.9 NG/DL (ref 0.8–1.5)

## 2023-11-27 ASSESSMENT — ENCOUNTER SYMPTOMS: SHORTNESS OF BREATH: 0

## 2023-11-27 NOTE — PROGRESS NOTES
HISTORY OF PRESENT ILLNESS  Royal Galo is a 61 y.o. female. HPI    Last here 6/5/23. Pt is here for routine care. Has a history of hypertension  BP today is  148/82 will repeat  No home BP readings for review  Taking hctz 12.5mg  She self dc'd losartan 50 mg a long time ago due to dizzy spells      Wt today is 196 lbs, up 10 lbs since lov   Discussed diet and wt/l  She did [employer] previously, states she is following the principles      Reviewed labs    Ordered labs     Lov pt c/o progressive R shoulder pain and tenderness     She saw TIEN Dill (ortho) 1/30/23 for her R shoulder, did an injection, referred her to PT  She declines PT  She takes meloxicam prn     She saw Dr. Dorinda Graves 7/22 (?) for hematochezia and LLQ pain   Likely had a colonoscopy, she is not sure where   She had a robotic sigmoid colectomy for diverticulitis on 11/22/22 by Dr. Humberto Chao (colorectal)  Last f/U w/ him 12/5/22, all is healing well   Endorses normal BM's  Will f/U PRN     Pt previously followed with Dr. Roger Han (ortho) for neck pain  No longer needed to see him   Pt also follows with Dr. Puja Najera (ortho) for neck pain  Last visit was 8/3/17   Pt received injections at that time     Pt saw Dr. Vera Archer (rheum) -- was sent by [facility]ara for fibromyalgia 4/17/19 , pt states that she has borderline RA  Was there in October 2019  Not currently taking plaquenil 200mg BID  Not planning on following up, discussed following up if she has more joint pain for now she is not interested     No longer taking blaine  Myalgias are much improved, doing quite well now     She is taking vit D OTC     Saw sebastien prince (derm) in the past discussed following up for routine skin check     ACP not on file. SDM is her .   Provided information in the past.       PREVENTIVE:    Colonoscopy: 7/22 Dr. Dorinda Graves, diverticulitis, will get report  Pap: [facility], 2/18, declines  Mammogram: [facility], 2/27/18, negative--declines  DEXA: 8/10/16, nl,

## 2023-12-04 ENCOUNTER — OFFICE VISIT (OUTPATIENT)
Age: 63
End: 2023-12-04
Payer: COMMERCIAL

## 2023-12-04 VITALS
BODY MASS INDEX: 31.6 KG/M2 | SYSTOLIC BLOOD PRESSURE: 147 MMHG | TEMPERATURE: 98 F | HEIGHT: 66 IN | DIASTOLIC BLOOD PRESSURE: 84 MMHG | OXYGEN SATURATION: 100 % | RESPIRATION RATE: 20 BRPM | WEIGHT: 196.6 LBS | HEART RATE: 61 BPM

## 2023-12-04 DIAGNOSIS — E03.8 SUBCLINICAL HYPOTHYROIDISM: ICD-10-CM

## 2023-12-04 DIAGNOSIS — I10 PRIMARY HYPERTENSION: ICD-10-CM

## 2023-12-04 DIAGNOSIS — Z00.00 PHYSICAL EXAM: Primary | ICD-10-CM

## 2023-12-04 DIAGNOSIS — M05.79 RHEUMATOID ARTHRITIS INVOLVING MULTIPLE SITES WITH POSITIVE RHEUMATOID FACTOR (HCC): ICD-10-CM

## 2023-12-04 DIAGNOSIS — M15.9 PRIMARY OSTEOARTHRITIS INVOLVING MULTIPLE JOINTS: ICD-10-CM

## 2023-12-04 DIAGNOSIS — E66.09 CLASS 1 OBESITY DUE TO EXCESS CALORIES WITH SERIOUS COMORBIDITY AND BODY MASS INDEX (BMI) OF 31.0 TO 31.9 IN ADULT: ICD-10-CM

## 2023-12-04 PROCEDURE — 3079F DIAST BP 80-89 MM HG: CPT | Performed by: INTERNAL MEDICINE

## 2023-12-04 PROCEDURE — 3077F SYST BP >= 140 MM HG: CPT | Performed by: INTERNAL MEDICINE

## 2023-12-04 PROCEDURE — 99396 PREV VISIT EST AGE 40-64: CPT | Performed by: INTERNAL MEDICINE

## 2023-12-04 RX ORDER — HYDROCHLOROTHIAZIDE 12.5 MG/1
12.5 TABLET ORAL DAILY
Qty: 90 TABLET | Refills: 1 | Status: SHIPPED | OUTPATIENT
Start: 2023-12-04

## 2023-12-04 RX ORDER — MELOXICAM 15 MG/1
15 TABLET ORAL DAILY
Qty: 30 TABLET | Refills: 2 | Status: SHIPPED | OUTPATIENT
Start: 2023-12-04

## 2023-12-04 SDOH — ECONOMIC STABILITY: FOOD INSECURITY: WITHIN THE PAST 12 MONTHS, YOU WORRIED THAT YOUR FOOD WOULD RUN OUT BEFORE YOU GOT MONEY TO BUY MORE.: NEVER TRUE

## 2023-12-04 SDOH — ECONOMIC STABILITY: FOOD INSECURITY: WITHIN THE PAST 12 MONTHS, THE FOOD YOU BOUGHT JUST DIDN'T LAST AND YOU DIDN'T HAVE MONEY TO GET MORE.: NEVER TRUE

## 2023-12-04 SDOH — ECONOMIC STABILITY: INCOME INSECURITY: HOW HARD IS IT FOR YOU TO PAY FOR THE VERY BASICS LIKE FOOD, HOUSING, MEDICAL CARE, AND HEATING?: NOT HARD AT ALL

## 2023-12-04 ASSESSMENT — PATIENT HEALTH QUESTIONNAIRE - PHQ9
2. FEELING DOWN, DEPRESSED OR HOPELESS: 0
SUM OF ALL RESPONSES TO PHQ QUESTIONS 1-9: 0
SUM OF ALL RESPONSES TO PHQ9 QUESTIONS 1 & 2: 0
1. LITTLE INTEREST OR PLEASURE IN DOING THINGS: 0

## 2023-12-05 LAB
ANION GAP SERPL CALC-SCNC: 4 MMOL/L (ref 5–15)
BUN SERPL-MCNC: 15 MG/DL (ref 6–20)
BUN/CREAT SERPL: 16 (ref 12–20)
CALCIUM SERPL-MCNC: 9.6 MG/DL (ref 8.5–10.1)
CHLORIDE SERPL-SCNC: 102 MMOL/L (ref 97–108)
CHOLEST SERPL-MCNC: 260 MG/DL
CO2 SERPL-SCNC: 32 MMOL/L (ref 21–32)
CREAT SERPL-MCNC: 0.94 MG/DL (ref 0.55–1.02)
EST. AVERAGE GLUCOSE BLD GHB EST-MCNC: 103 MG/DL
GLUCOSE SERPL-MCNC: 96 MG/DL (ref 65–100)
HBA1C MFR BLD: 5.2 % (ref 4–5.6)
HDLC SERPL-MCNC: 69 MG/DL
HDLC SERPL: 3.8 (ref 0–5)
LDLC SERPL CALC-MCNC: 142.8 MG/DL (ref 0–100)
POTASSIUM SERPL-SCNC: 4.7 MMOL/L (ref 3.5–5.1)
SODIUM SERPL-SCNC: 138 MMOL/L (ref 136–145)
T4 FREE SERPL-MCNC: 0.8 NG/DL (ref 0.8–1.5)
TRIGL SERPL-MCNC: 241 MG/DL
TSH SERPL DL<=0.05 MIU/L-ACNC: 3.54 UIU/ML (ref 0.36–3.74)
VLDLC SERPL CALC-MCNC: 48.2 MG/DL

## 2023-12-06 DIAGNOSIS — E78.01 FAMILIAL HYPERCHOLESTEROLEMIA: Primary | ICD-10-CM

## 2023-12-06 RX ORDER — ROSUVASTATIN CALCIUM 5 MG/1
5 TABLET, COATED ORAL NIGHTLY
Qty: 90 TABLET | Refills: 1 | Status: SHIPPED | OUTPATIENT
Start: 2023-12-06

## 2023-12-06 NOTE — TELEPHONE ENCOUNTER
PCP: Ramana Eduardo MD    Last appt: 12/4/2023  Future Appointments   Date Time Provider 4600  [address]   6/3/2024  8:45 AM Ramana Eduardo MD [facility] BS AMB       Requested Prescriptions     Pending Prescriptions Disp Refills    rosuvastatin (CRESTOR) 5 MG tablet 90 tablet 1     Sig: Take 1 tablet by mouth nightly

## 2024-04-17 ENCOUNTER — OFFICE VISIT (OUTPATIENT)
Age: 64
End: 2024-04-17
Payer: COMMERCIAL

## 2024-04-17 VITALS
RESPIRATION RATE: 20 BRPM | TEMPERATURE: 98.1 F | WEIGHT: 206.2 LBS | SYSTOLIC BLOOD PRESSURE: 136 MMHG | DIASTOLIC BLOOD PRESSURE: 83 MMHG | OXYGEN SATURATION: 97 % | HEART RATE: 64 BPM | BODY MASS INDEX: 33.14 KG/M2 | HEIGHT: 66 IN

## 2024-04-17 DIAGNOSIS — E78.00 PURE HYPERCHOLESTEROLEMIA: ICD-10-CM

## 2024-04-17 DIAGNOSIS — E03.8 SUBCLINICAL HYPOTHYROIDISM: ICD-10-CM

## 2024-04-17 DIAGNOSIS — Z01.810 PRE-OPERATIVE CARDIOVASCULAR EXAMINATION: Primary | ICD-10-CM

## 2024-04-17 DIAGNOSIS — M05.79 RHEUMATOID ARTHRITIS INVOLVING MULTIPLE SITES WITH POSITIVE RHEUMATOID FACTOR (HCC): ICD-10-CM

## 2024-04-17 DIAGNOSIS — I10 PRIMARY HYPERTENSION: ICD-10-CM

## 2024-04-17 DIAGNOSIS — N39.46 MIXED INCONTINENCE: ICD-10-CM

## 2024-04-17 DIAGNOSIS — Z01.810 PRE-OPERATIVE CARDIOVASCULAR EXAMINATION: ICD-10-CM

## 2024-04-17 PROCEDURE — 99214 OFFICE O/P EST MOD 30 MIN: CPT | Performed by: INTERNAL MEDICINE

## 2024-04-17 PROCEDURE — 3079F DIAST BP 80-89 MM HG: CPT | Performed by: INTERNAL MEDICINE

## 2024-04-17 PROCEDURE — 3075F SYST BP GE 130 - 139MM HG: CPT | Performed by: INTERNAL MEDICINE

## 2024-04-17 PROCEDURE — 93000 ELECTROCARDIOGRAM COMPLETE: CPT | Performed by: INTERNAL MEDICINE

## 2024-04-17 ASSESSMENT — PATIENT HEALTH QUESTIONNAIRE - PHQ9
SUM OF ALL RESPONSES TO PHQ QUESTIONS 1-9: 0
SUM OF ALL RESPONSES TO PHQ9 QUESTIONS 1 & 2: 0
SUM OF ALL RESPONSES TO PHQ QUESTIONS 1-9: 0
1. LITTLE INTEREST OR PLEASURE IN DOING THINGS: NOT AT ALL
SUM OF ALL RESPONSES TO PHQ QUESTIONS 1-9: 0
SUM OF ALL RESPONSES TO PHQ QUESTIONS 1-9: 0
2. FEELING DOWN, DEPRESSED OR HOPELESS: NOT AT ALL

## 2024-04-17 NOTE — PROGRESS NOTES
\"Have you been to the ER, urgent care clinic since your last visit?  Hospitalized since your last visit?\"    NO    “Have you seen or consulted any other health care providers outside of [facility] since your last visit?”    NO    Have you had a mammogram?”   NO    Date of last Mammogram: 2/27/2018             Click Here for Release of Records Request\  
visit tolerating well     She is taking vit D OTC     Saw sebastien prince (derm) in the past discussed following up for routine skin check     ACP not on file. SDM is her .  Provided information in the past.       PREVENTIVE:    Colonoscopy:  Dr. Contreras?, diverticulitis, will get report  Pap: [facility], , declines  Mammogram: [facility], 18, negative--declines  DEXA: 8/10/16, nl, declines  Tdap:   Pneumovax: not yet needed  [id number]: not yet needed   Shingrix: 1st dose 21 2nd dose 22  Flu shot: declines    Eye exam: 3/24 Dr Damico, surgery scheduled   A1C:   5.5  5.8  5.6  5.2  5.2  Hep C screen: , negative  HIV screen:  neg  Lipids:    EK/24- nsr  Covid: 2 doses (Pfizer) +  Moderna booster, declines booster    Patient Active Problem List   Diagnosis    Fibrocystic disease of breast    DJD (degenerative joint disease)    Vitamin D deficiency    Obesity    HTN (hypertension)    Mixed incontinence    Rectocele    RA (rheumatoid arthritis) (HCC)    Chronic neck pain    Diverticulitis    Subclinical hypothyroidism     Current Outpatient Medications   Medication Sig Dispense Refill    rosuvastatin (CRESTOR) 5 MG tablet Take 1 tablet by mouth nightly 90 tablet 1    hydroCHLOROthiazide (HYDRODIURIL) 12.5 MG tablet Take 1 tablet by mouth daily 90 tablet 1    meloxicam (MOBIC) 15 MG tablet Take 1 tablet by mouth daily 30 tablet 2    Multiple Vitamins-Minerals (THERAPEUTIC MULTIVITAMIN-MINERALS) tablet Take 1 tablet by mouth daily       No current facility-administered medications for this visit.     Past Surgical History:   Procedure Laterality Date    CYST REMOVAL  2015    from finger    CYST REMOVAL  2015    from jaw and back     FINGER AMPUTATION      left index    HX PARTIAL COLECTOMY Left 2022    HYSTERECTOMY (CERVIX STATUS UNKNOWN)      partial    ORTHOPEDIC SURGERY      right thumb fracture repair         Lab

## 2024-04-18 LAB
ALBUMIN SERPL-MCNC: 3.9 G/DL (ref 3.5–5)
ALBUMIN/GLOB SERPL: 1.1 (ref 1.1–2.2)
ALP SERPL-CCNC: 106 U/L (ref 45–117)
ALT SERPL-CCNC: 62 U/L (ref 12–78)
ANION GAP SERPL CALC-SCNC: 1 MMOL/L (ref 5–15)
AST SERPL-CCNC: 36 U/L (ref 15–37)
BILIRUB SERPL-MCNC: 0.4 MG/DL (ref 0.2–1)
BUN SERPL-MCNC: 15 MG/DL (ref 6–20)
BUN/CREAT SERPL: 15 (ref 12–20)
CALCIUM SERPL-MCNC: 10 MG/DL (ref 8.5–10.1)
CHLORIDE SERPL-SCNC: 100 MMOL/L (ref 97–108)
CHOLEST SERPL-MCNC: 252 MG/DL
CK SERPL-CCNC: 54 U/L (ref 26–192)
CO2 SERPL-SCNC: 33 MMOL/L (ref 21–32)
CREAT SERPL-MCNC: 0.99 MG/DL (ref 0.55–1.02)
GLOBULIN SER CALC-MCNC: 3.7 G/DL (ref 2–4)
GLUCOSE SERPL-MCNC: 94 MG/DL (ref 65–100)
HDLC SERPL-MCNC: 77 MG/DL
HDLC SERPL: 3.3 (ref 0–5)
LDLC SERPL CALC-MCNC: 132.2 MG/DL (ref 0–100)
POTASSIUM SERPL-SCNC: 4.2 MMOL/L (ref 3.5–5.1)
PROT SERPL-MCNC: 7.6 G/DL (ref 6.4–8.2)
SODIUM SERPL-SCNC: 134 MMOL/L (ref 136–145)
T4 FREE SERPL-MCNC: 0.9 NG/DL (ref 0.8–1.5)
TRIGL SERPL-MCNC: 214 MG/DL
TSH SERPL DL<=0.05 MIU/L-ACNC: 2.97 UIU/ML (ref 0.36–3.74)
VLDLC SERPL CALC-MCNC: 42.8 MG/DL

## 2024-04-19 ENCOUNTER — TELEPHONE (OUTPATIENT)
Age: 64
End: 2024-04-19

## 2024-04-19 NOTE — TELEPHONE ENCOUNTER
Pt presents to clinic for note stating she is cleared for surgery  VORB per Dr. Deleon note written and given to pt along with note from visit to take to surgery office  Pt verbalizes understanding of the instructions and has no further questions at this time.

## 2024-04-19 NOTE — TELEPHONE ENCOUNTER
Pt also sent a [facility] regarding this issue.      Regarding SURGICAL CLEARANCE    Caller states the following details:    Surgery Date:  4/24      Date of Last appointment:  4/17/2024 PRE OP JENNIFER            Call pt to states they have not received the pre-op paper work /surgical clearance yet.    It needs to be sent to [facility]      Please call pt to advise if problems

## 2024-04-23 ENCOUNTER — TELEPHONE (OUTPATIENT)
Age: 64
End: 2024-04-23

## 2024-04-23 NOTE — TELEPHONE ENCOUNTER
States faxing a history and physical form to office right now    Needs it completed --signed and dated    Surgery is tomorrow    Sending to nurse station fax

## 2024-05-28 RX ORDER — HYDROCHLOROTHIAZIDE 12.5 MG/1
12.5 TABLET ORAL DAILY
Qty: 3900 TABLET | Refills: 1 | Status: SHIPPED | OUTPATIENT
Start: 2024-05-28

## 2024-05-30 RX ORDER — ROSUVASTATIN CALCIUM 5 MG/1
5 TABLET, COATED ORAL NIGHTLY
Qty: 90 TABLET | Refills: 1 | Status: SHIPPED | OUTPATIENT
Start: 2024-05-30

## 2024-10-08 NOTE — PROGRESS NOTES
HISTORY OF PRESENT ILLNESS  Samantha Carl is a 64 y.o. female.  HPI  Last here 4/17/24. Pt is here for routine care.        Has a history of hypertension  BP today is 155/86 will repeat   No home BP readings for review  Taking hctz 12.5mg, did not take this today, normally takes at 2:30 AM   She self dc'd losartan 50 mg a long time ago due to dizzy spells        Wt today is 208 lbs, lov 206 lbs, she has gained ~20 lbs in the last few visits   Discussed diet and wt/l, WW,calories, portions  Advised to avoid snacking in between meals  She states she is 1 large meal per day  She did [employer] previously, states she is following the principles      Reviewed labs    Ordered labs        She saw TIEN Dill (ortho) 1/30/23 for her R shoulder, did an injection  She declines PT  She takes meloxicam prn     She saw Dr. Florian 7/22 (?) for hematochezia and LLQ pain   Likely had a colonoscopy, she is not sure where   She had a robotic sigmoid colectomy for diverticulitis on 11/22/22 by Dr. Contreras (colorectal)  Will f/U PRN     Pt previously followed with Dr. Salcido (ortho) for neck pain  No longer needed to see him   Pt also follows with Dr. Pedraza (ortho) for neck pain  Last visit was 8/3/17   Pt received injections at that time        Pt saw Dr. Whitney Antoine (rheum) -- was sent by [facility] for fibromyalgia 4/17/19 , pt states that she has borderline RA  Was there in October 2019  Not currently taking plaquenil 200mg BID  Declines follow-up     No longer taking gabapentin  Myalgias are much improved, doing quite well now     on Crestor since last office visit, reports minimal compliance  Advised to take with her BP medications so she remembers to take it      She is taking vit D OTC     Saw sebastien prince (derm) in the past discussed following up for routine skin check     ACP not on file. SDM is her .  Provided information in the past.     Reviewed colonoscopy 9/22       PREVENTIVE:    Colonoscopy: 9/22 Dr. Florian,

## 2024-10-15 ENCOUNTER — OFFICE VISIT (OUTPATIENT)
Age: 64
End: 2024-10-15
Payer: COMMERCIAL

## 2024-10-15 VITALS
HEART RATE: 59 BPM | RESPIRATION RATE: 18 BRPM | WEIGHT: 208.6 LBS | BODY MASS INDEX: 33.52 KG/M2 | SYSTOLIC BLOOD PRESSURE: 131 MMHG | OXYGEN SATURATION: 99 % | HEIGHT: 66 IN | TEMPERATURE: 97.8 F | DIASTOLIC BLOOD PRESSURE: 83 MMHG

## 2024-10-15 DIAGNOSIS — E87.1 HYPONATREMIA: ICD-10-CM

## 2024-10-15 DIAGNOSIS — E78.2 MIXED HYPERLIPIDEMIA: ICD-10-CM

## 2024-10-15 DIAGNOSIS — E66.09 CLASS 1 OBESITY DUE TO EXCESS CALORIES WITH SERIOUS COMORBIDITY AND BODY MASS INDEX (BMI) OF 31.0 TO 31.9 IN ADULT: ICD-10-CM

## 2024-10-15 DIAGNOSIS — I10 PRIMARY HYPERTENSION: ICD-10-CM

## 2024-10-15 DIAGNOSIS — E55.9 VITAMIN D DEFICIENCY: ICD-10-CM

## 2024-10-15 DIAGNOSIS — E03.8 SUBCLINICAL HYPOTHYROIDISM: ICD-10-CM

## 2024-10-15 DIAGNOSIS — E66.811 CLASS 1 OBESITY DUE TO EXCESS CALORIES WITH SERIOUS COMORBIDITY AND BODY MASS INDEX (BMI) OF 31.0 TO 31.9 IN ADULT: ICD-10-CM

## 2024-10-15 DIAGNOSIS — N39.46 MIXED INCONTINENCE: ICD-10-CM

## 2024-10-15 DIAGNOSIS — I10 PRIMARY HYPERTENSION: Primary | ICD-10-CM

## 2024-10-15 DIAGNOSIS — M05.79 RHEUMATOID ARTHRITIS INVOLVING MULTIPLE SITES WITH POSITIVE RHEUMATOID FACTOR (HCC): ICD-10-CM

## 2024-10-15 PROCEDURE — 3075F SYST BP GE 130 - 139MM HG: CPT | Performed by: INTERNAL MEDICINE

## 2024-10-15 PROCEDURE — 3079F DIAST BP 80-89 MM HG: CPT | Performed by: INTERNAL MEDICINE

## 2024-10-15 PROCEDURE — 99214 OFFICE O/P EST MOD 30 MIN: CPT | Performed by: INTERNAL MEDICINE

## 2024-10-15 ASSESSMENT — PATIENT HEALTH QUESTIONNAIRE - PHQ9
SUM OF ALL RESPONSES TO PHQ QUESTIONS 1-9: 0
2. FEELING DOWN, DEPRESSED OR HOPELESS: NOT AT ALL
SUM OF ALL RESPONSES TO PHQ QUESTIONS 1-9: 0
1. LITTLE INTEREST OR PLEASURE IN DOING THINGS: NOT AT ALL
SUM OF ALL RESPONSES TO PHQ9 QUESTIONS 1 & 2: 0

## 2024-10-15 NOTE — PROGRESS NOTES
\"Have you been to the ER, urgent care clinic since your last visit?  Hospitalized since your last visit?\"    NO    “Have you seen or consulted any other health care providers outside our system since your last visit?”    NO    Have you had a mammogram?”   NO pt refuse    Date of last Mammogram: 2/27/2018

## 2024-10-16 LAB
25(OH)D3+25(OH)D2 SERPL-MCNC: 42.5 NG/ML (ref 30–100)
BUN SERPL-MCNC: 12 MG/DL (ref 8–27)
BUN/CREAT SERPL: 12 (ref 12–28)
CALCIUM SERPL-MCNC: 9.7 MG/DL (ref 8.7–10.3)
CHLORIDE SERPL-SCNC: 99 MMOL/L (ref 96–106)
CO2 SERPL-SCNC: 24 MMOL/L (ref 20–29)
CREAT SERPL-MCNC: 0.98 MG/DL (ref 0.57–1)
EGFRCR SERPLBLD CKD-EPI 2021: 64 ML/MIN/1.73
ERYTHROCYTE [DISTWIDTH] IN BLOOD BY AUTOMATED COUNT: 12.6 % (ref 11.7–15.4)
GLUCOSE SERPL-MCNC: 89 MG/DL (ref 70–99)
HCT VFR BLD AUTO: 43.4 % (ref 34–46.6)
HGB BLD-MCNC: 14.2 G/DL (ref 11.1–15.9)
MCH RBC QN AUTO: 32.1 PG (ref 26.6–33)
MCHC RBC AUTO-ENTMCNC: 32.7 G/DL (ref 31.5–35.7)
MCV RBC AUTO: 98 FL (ref 79–97)
PLATELET # BLD AUTO: 265 X10E3/UL (ref 150–450)
POTASSIUM SERPL-SCNC: 4.8 MMOL/L (ref 3.5–5.2)
RBC # BLD AUTO: 4.42 X10E6/UL (ref 3.77–5.28)
SODIUM SERPL-SCNC: 138 MMOL/L (ref 134–144)
WBC # BLD AUTO: 6.1 X10E3/UL (ref 3.4–10.8)

## 2025-01-09 RX ORDER — ROSUVASTATIN CALCIUM 5 MG/1
5 TABLET, COATED ORAL NIGHTLY
Qty: 90 TABLET | Refills: 1 | Status: SHIPPED | OUTPATIENT
Start: 2025-01-09

## 2025-02-07 ENCOUNTER — TELEMEDICINE (OUTPATIENT)
Age: 65
End: 2025-02-07
Payer: COMMERCIAL

## 2025-02-07 DIAGNOSIS — J10.1 INFLUENZA A: Primary | ICD-10-CM

## 2025-02-07 DIAGNOSIS — B34.9 VIRAL SYNDROME: ICD-10-CM

## 2025-02-07 PROCEDURE — 99213 OFFICE O/P EST LOW 20 MIN: CPT | Performed by: INTERNAL MEDICINE

## 2025-02-07 RX ORDER — ONDANSETRON 4 MG/1
4 TABLET, FILM COATED ORAL DAILY PRN
Qty: 30 TABLET | Refills: 0 | Status: SHIPPED | OUTPATIENT
Start: 2025-02-07

## 2025-02-07 RX ORDER — MULTIVIT-MIN/IRON/FOLIC ACID/K 18-600-40
2000 CAPSULE ORAL DAILY
COMMUNITY

## 2025-02-07 RX ORDER — BENZONATATE 200 MG/1
200 CAPSULE ORAL 3 TIMES DAILY PRN
Qty: 30 CAPSULE | Refills: 0 | Status: SHIPPED | OUTPATIENT
Start: 2025-02-07 | End: 2025-02-17

## 2025-02-07 RX ORDER — OSELTAMIVIR PHOSPHATE 75 MG/1
75 CAPSULE ORAL 2 TIMES DAILY
Qty: 10 CAPSULE | Refills: 0 | Status: SHIPPED | OUTPATIENT
Start: 2025-02-07 | End: 2025-02-12

## 2025-02-07 SDOH — ECONOMIC STABILITY: FOOD INSECURITY: WITHIN THE PAST 12 MONTHS, THE FOOD YOU BOUGHT JUST DIDN'T LAST AND YOU DIDN'T HAVE MONEY TO GET MORE.: NEVER TRUE

## 2025-02-07 SDOH — ECONOMIC STABILITY: FOOD INSECURITY: WITHIN THE PAST 12 MONTHS, YOU WORRIED THAT YOUR FOOD WOULD RUN OUT BEFORE YOU GOT MONEY TO BUY MORE.: NEVER TRUE

## 2025-02-07 ASSESSMENT — PATIENT HEALTH QUESTIONNAIRE - PHQ9
1. LITTLE INTEREST OR PLEASURE IN DOING THINGS: NOT AT ALL
SUM OF ALL RESPONSES TO PHQ QUESTIONS 1-9: 0
SUM OF ALL RESPONSES TO PHQ9 QUESTIONS 1 & 2: 0
SUM OF ALL RESPONSES TO PHQ QUESTIONS 1-9: 0
2. FEELING DOWN, DEPRESSED OR HOPELESS: NOT AT ALL
SUM OF ALL RESPONSES TO PHQ QUESTIONS 1-9: 0
SUM OF ALL RESPONSES TO PHQ QUESTIONS 1-9: 0

## 2025-02-07 ASSESSMENT — ENCOUNTER SYMPTOMS
SHORTNESS OF BREATH: 0
DIARRHEA: 1
COUGH: 1
VOMITING: 1

## 2025-02-07 NOTE — PROGRESS NOTES
HISTORY OF PRESENT ILLNESS  Samantha Carl is a 64 y.o. female.  HPI    This is an established visit completed with telemedicine was completed with video assist. The patient acknowledges and agrees to this method of visitation.    Last here 10/15/24. Presents for acute care.     She started getting sick Wednesday with vomiting, diarrhea, fever (101.4), cough, chest congestion, sore throat, chills. Denies sinus pain or pressure, ear pain. She is no longer vomiting today, still has diarrhea that has not improved. She is taking mucinex and robitussin. Fever resolved yesterday. Her  is sick now with the same symptoms. She will get a flu/covid test from the pharmacy.     Patient Active Problem List   Diagnosis    Fibrocystic disease of breast    DJD (degenerative joint disease)    Vitamin D deficiency    Obesity    HTN (hypertension)    Mixed incontinence    Rectocele    RA (rheumatoid arthritis) (HCC)    Chronic neck pain    Diverticulitis    Subclinical hypothyroidism    Mixed hyperlipidemia     Current Outpatient Medications   Medication Sig Dispense Refill    rosuvastatin (CRESTOR) 5 MG tablet TAKE 1 TABLET BY MOUTH EVERY DAY AT NIGHT 90 tablet 1    hydroCHLOROthiazide 12.5 MG tablet TAKE 1 TABLET BY MOUTH EVERY DAY 3900 tablet 1    meloxicam (MOBIC) 15 MG tablet Take 1 tablet by mouth daily 30 tablet 2     No current facility-administered medications for this visit.     Past Surgical History:   Procedure Laterality Date    CYST REMOVAL  02/2015    from finger    CYST REMOVAL  02/2015    from jaw and back     FINGER AMPUTATION      left index    HX PARTIAL COLECTOMY Left 11/22/2022    HYSTERECTOMY (CERVIX STATUS UNKNOWN)      partial    ORTHOPEDIC SURGERY      right thumb fracture repair         Lab Results   Component Value Date    WBC 6.1 10/15/2024    HGB 14.2 10/15/2024    HCT 43.4 10/15/2024    MCV 98 (H) 10/15/2024     10/15/2024     Lab Results   Component Value Date    CHOL 252 (H) 04/17/2024

## 2025-02-10 ENCOUNTER — TELEPHONE (OUTPATIENT)
Age: 65
End: 2025-02-10

## 2025-02-10 NOTE — TELEPHONE ENCOUNTER
Pt called in states would like a doctors note for 02/07/25 appt. States if possible would like letter sent through [employer].

## 2025-02-11 NOTE — TELEPHONE ENCOUNTER
Called, spoke to pt.  Two pt identifiers confirmed.     Patient informed per Etta Deleon MD    Please see message below.  Return to work today on Monday--check with patient if she needs to return to work later this week that is fine as well     Patient states the letter dated for Monday is fine.    Patient returned to work on Monday 02/10/2025.     Patient verbalized understanding of information discussed w/ no further questions at this time.     Emma Miller LPN

## 2025-02-11 NOTE — TELEPHONE ENCOUNTER
Pt called to follow up    Pt states return to work date should be 2/10 (yesterday)  Declines changing date.      Pt states needs note asap, upload to [email or web address].

## 2025-04-14 NOTE — PROGRESS NOTES
HISTORY OF PRESENT ILLNESS  Samantha Carl is a 64 y.o. female.  HPI  Last here 10/15/24. Pt is here for routine care.        Has a history of hypertension  BP today is 134/82  No home BP readings for review  Taking hctz 12.5mg, took this today   She self dc'd losartan 50 mg a long time ago due to dizzy spells         Wt today is 192 lbs, down 16 lbs since lov   She is doing keto, her 's diabetes worsened and she has been following a diet with him   She did [employer] previously, states she is following the principles      Reviewed labs    Ordered labs        She saw TIEN Dill (ortho) 1/30/23 for her R shoulder, did an injection  She declines PT  She takes meloxicam prn     She saw Dr. Florian 7/22 for hematochezia and LLQ pain   She had a robotic sigmoid colectomy for diverticulitis on 11/22/22 by Dr. Contreras (colorectal)  Will f/U PRN     Pt previously followed with Dr. Salcido (ortho) for neck pain  No longer needed to see him   Pt also follows with Dr. Pedraza (ortho) for neck pain  Last visit was 8/3/17   Pt received injections at that time        Pt saw Dr. Whitney Antoine (rheum) -- was sent by [facility] for fibromyalgia 4/17/19 , pt states that she has borderline RA  Was there in October 2019  Not currently taking plaquenil 200mg BID  Declines follow-up     No longer taking gabapentin  Myalgias are much improved, doing quite well now     Continues on Crestor 5 mg daily, reports improved compliance, she takes combined with her BP meds to remember she is now taking it every day     She is taking vit D OTC     Saw sebastien prince (derm) in the past discussed following up for routine skin check    She will be on medicare B at her next visit      ACP not on file. SDM is her .  Provided information in the past.       PREVENTIVE:    Colonoscopy: 9/22 Dr. Florian, diverticulitis, hemorrhoids, she declines another colo   Pap: [facility], 2/18, declines  Mammogram: [facility], 2/27/18,

## 2025-04-21 ENCOUNTER — OFFICE VISIT (OUTPATIENT)
Age: 65
End: 2025-04-21
Payer: COMMERCIAL

## 2025-04-21 VITALS
OXYGEN SATURATION: 95 % | BODY MASS INDEX: 30.86 KG/M2 | SYSTOLIC BLOOD PRESSURE: 134 MMHG | TEMPERATURE: 98.2 F | WEIGHT: 192 LBS | DIASTOLIC BLOOD PRESSURE: 82 MMHG | RESPIRATION RATE: 15 BRPM | HEART RATE: 68 BPM | HEIGHT: 66 IN

## 2025-04-21 DIAGNOSIS — E55.9 VITAMIN D DEFICIENCY: ICD-10-CM

## 2025-04-21 DIAGNOSIS — E03.8 SUBCLINICAL HYPOTHYROIDISM: ICD-10-CM

## 2025-04-21 DIAGNOSIS — I10 PRIMARY HYPERTENSION: ICD-10-CM

## 2025-04-21 DIAGNOSIS — M05.79 RHEUMATOID ARTHRITIS INVOLVING MULTIPLE SITES WITH POSITIVE RHEUMATOID FACTOR (HCC): ICD-10-CM

## 2025-04-21 DIAGNOSIS — E78.2 MIXED HYPERLIPIDEMIA: ICD-10-CM

## 2025-04-21 DIAGNOSIS — Z00.00 PHYSICAL EXAM: Primary | ICD-10-CM

## 2025-04-21 PROCEDURE — 3075F SYST BP GE 130 - 139MM HG: CPT | Performed by: INTERNAL MEDICINE

## 2025-04-21 PROCEDURE — 3078F DIAST BP <80 MM HG: CPT | Performed by: INTERNAL MEDICINE

## 2025-04-21 PROCEDURE — 99396 PREV VISIT EST AGE 40-64: CPT | Performed by: INTERNAL MEDICINE

## 2025-04-21 ASSESSMENT — PATIENT HEALTH QUESTIONNAIRE - PHQ9
SUM OF ALL RESPONSES TO PHQ QUESTIONS 1-9: 0
SUM OF ALL RESPONSES TO PHQ QUESTIONS 1-9: 0
1. LITTLE INTEREST OR PLEASURE IN DOING THINGS: NOT AT ALL
2. FEELING DOWN, DEPRESSED OR HOPELESS: NOT AT ALL
SUM OF ALL RESPONSES TO PHQ QUESTIONS 1-9: 0
SUM OF ALL RESPONSES TO PHQ QUESTIONS 1-9: 0

## 2025-04-21 NOTE — PROGRESS NOTES
\"Have you been to the ER, urgent care clinic since your last visit?  Hospitalized since your last visit?\"    NO    “Have you seen or consulted any other health care providers outside our system since your last visit?”    NO    Have you had a mammogram?”   NO does not do them    Date of last Mammogram: 2/27/2018

## 2025-04-22 ENCOUNTER — RESULTS FOLLOW-UP (OUTPATIENT)
Age: 65
End: 2025-04-22

## 2025-04-22 LAB
ALBUMIN SERPL-MCNC: 4.6 G/DL (ref 3.9–4.9)
ALP SERPL-CCNC: 93 IU/L (ref 44–121)
ALT SERPL-CCNC: 37 IU/L (ref 0–32)
AST SERPL-CCNC: 27 IU/L (ref 0–40)
BILIRUB SERPL-MCNC: 0.4 MG/DL (ref 0–1.2)
BUN SERPL-MCNC: 13 MG/DL (ref 8–27)
BUN/CREAT SERPL: 16 (ref 12–28)
CALCIUM SERPL-MCNC: 9.6 MG/DL (ref 8.7–10.3)
CHLORIDE SERPL-SCNC: 100 MMOL/L (ref 96–106)
CHOLEST SERPL-MCNC: 207 MG/DL (ref 100–199)
CO2 SERPL-SCNC: 25 MMOL/L (ref 20–29)
CREAT SERPL-MCNC: 0.81 MG/DL (ref 0.57–1)
EGFRCR SERPLBLD CKD-EPI 2021: 81 ML/MIN/1.73
ERYTHROCYTE [DISTWIDTH] IN BLOOD BY AUTOMATED COUNT: 12.7 % (ref 11.7–15.4)
GLOBULIN SER CALC-MCNC: 2.4 G/DL (ref 1.5–4.5)
GLUCOSE SERPL-MCNC: 94 MG/DL (ref 70–99)
HBA1C MFR BLD: 5.7 % (ref 4.8–5.6)
HCT VFR BLD AUTO: 43.6 % (ref 34–46.6)
HDLC SERPL-MCNC: 64 MG/DL
HGB BLD-MCNC: 14 G/DL (ref 11.1–15.9)
LDLC SERPL CALC-MCNC: 109 MG/DL (ref 0–99)
MCH RBC QN AUTO: 31.7 PG (ref 26.6–33)
MCHC RBC AUTO-ENTMCNC: 32.1 G/DL (ref 31.5–35.7)
MCV RBC AUTO: 99 FL (ref 79–97)
PLATELET # BLD AUTO: 242 X10E3/UL (ref 150–450)
POTASSIUM SERPL-SCNC: 4.3 MMOL/L (ref 3.5–5.2)
PROT SERPL-MCNC: 7 G/DL (ref 6–8.5)
RBC # BLD AUTO: 4.41 X10E6/UL (ref 3.77–5.28)
SODIUM SERPL-SCNC: 139 MMOL/L (ref 134–144)
TRIGL SERPL-MCNC: 201 MG/DL (ref 0–149)
TSH SERPL DL<=0.005 MIU/L-ACNC: 2.76 UIU/ML (ref 0.45–4.5)
VLDLC SERPL CALC-MCNC: 34 MG/DL (ref 5–40)
WBC # BLD AUTO: 6.4 X10E3/UL (ref 3.4–10.8)

## 2025-04-22 RX ORDER — ROSUVASTATIN CALCIUM 10 MG/1
10 TABLET, COATED ORAL DAILY
Qty: 90 TABLET | Refills: 1 | Status: SHIPPED | OUTPATIENT
Start: 2025-04-22

## 2025-08-24 RX ORDER — HYDROCHLOROTHIAZIDE 12.5 MG/1
12.5 TABLET ORAL DAILY
Qty: 90 TABLET | Refills: 0 | Status: SHIPPED | OUTPATIENT
Start: 2025-08-24

## (undated) DEVICE — LEGGINGS: Brand: CONVERTORS

## (undated) DEVICE — OBTRTR BLDELSS OPT 8MM DISP -- DA VINICI XI - SNGL USE

## (undated) DEVICE — SUTURE VCRL SZ 2-0 L27IN ABSRB UD L26MM SH 1/2 CIR J417H

## (undated) DEVICE — SPONGE LAPAROTOMY W18XL18IN WHITE STRUNG RADIOPAQUE STERILE

## (undated) DEVICE — SUTURE SZ 0 27IN 5/8 CIR UR-6  TAPER PT VIOLET ABSRB VICRYL J603H

## (undated) DEVICE — Device

## (undated) DEVICE — 3M™ IOBAN™ 2 ANTIMICROBIAL INCISE DRAPE 6650EZ: Brand: IOBAN™ 2

## (undated) DEVICE — TUBING IRRIG L77IN DIA0.241IN L BOR FOR CYSTO W/ NVENT

## (undated) DEVICE — GLOVE ORANGE PI 7   MSG9070

## (undated) DEVICE — VISUALIZATION SYSTEM: Brand: CLEARIFY

## (undated) DEVICE — CONNECTOR CATH URET SIDE PRT FOR FRIC CONN [UCA595] [GU TEK]

## (undated) DEVICE — DRAPE,ROBOTICS,STERILE: Brand: MEDLINE

## (undated) DEVICE — SUT MCRYL 4-0 27IN PS2 UD --

## (undated) DEVICE — INSUFFLATION NEEDLE: Brand: SURGINEEDLE

## (undated) DEVICE — ARM DRAPE

## (undated) DEVICE — STAPLER EXT 65MM S STL AUTO DISP PURSTRING

## (undated) DEVICE — YANKAUER,POOLE TIP,STERILE,50/CS: Brand: MEDLINE

## (undated) DEVICE — HYPODERMIC SAFETY NEEDLE: Brand: MAGELLAN

## (undated) DEVICE — GRASPER ENDOSCP TIP L10MM ANVIL ROT RATCH HNDL DISP

## (undated) DEVICE — SPONGE GZ W4XL4IN COT 12 PLY TYP VII WVN C FLD DSGN

## (undated) DEVICE — GLOVE ORANGE PI 7 1/2   MSG9075

## (undated) DEVICE — STAPLER INT DIA29MM CLS STPL H1.5-2.2MM OPN LEG L5.2MM 26

## (undated) DEVICE — SOLUTION IRRIG 1000ML STRL H2O USP PLAS POUR BTL

## (undated) DEVICE — REDUCER CANN ENDOWRIST 12-8MM -- DA VINCI XI - SNGL USE

## (undated) DEVICE — STAPLER 60: Brand: SUREFORM

## (undated) DEVICE — GOWN,SIRUS,NONRNF,SETINSLV,XL,20/CS: Brand: MEDLINE

## (undated) DEVICE — SUTURE PROL SZ 2-0 L36IN NONABSORBABLE BLU SH L26MM 1/2 CIR 8523H

## (undated) DEVICE — INTENDED FOR TISSUE SEPARATION, AND OTHER PROCEDURES THAT REQUIRE A SHARP SURGICAL BLADE TO PUNCTURE OR CUT.: Brand: BARD-PARKER ® CARBON RIB-BACK BLADES

## (undated) DEVICE — GLOVE SURG SZ 75 L12IN FNGR THK79MIL GRN LTX FREE

## (undated) DEVICE — TUBING, SUCTION, 1/4" X 10', STRAIGHT: Brand: MEDLINE

## (undated) DEVICE — VESSEL SEALER: Brand: ENDOWRIST

## (undated) DEVICE — ROCKER SWITCH PENCIL BLADE ELECTRODE, HOLSTER: Brand: EDGE

## (undated) DEVICE — CYSTO MRMC: Brand: MEDLINE INDUSTRIES, INC.

## (undated) DEVICE — TUBING, SUCTION, 1/4" X 12', STRAIGHT: Brand: MEDLINE

## (undated) DEVICE — TROCAR: Brand: KII FIOS FIRST ENTRY

## (undated) DEVICE — GENERAL LAPAROSCOPY-MRMC: Brand: MEDLINE INDUSTRIES, INC.

## (undated) DEVICE — SUTURE PDS II SZ 0 L60IN ABSRB VLT L48MM CTX 1/2 CIR Z990G

## (undated) DEVICE — ACCESS PLATFORM FOR MINIMALLY INVASIVE SURGERY: Brand: GELPOINT®  MINI ADVANCED ACCESS PLATFORM

## (undated) DEVICE — SEAL UNIV 5-8MM DISP BX/10 -- DA VINCI XI - SNGL USE

## (undated) DEVICE — SYRINGE IRRIG 60ML SFT PLIABLE BLB EZ TO GRP 1 HND USE W/

## (undated) DEVICE — SEAL

## (undated) DEVICE — SYR 10ML LUER LOK 1/5ML GRAD --

## (undated) DEVICE — TOTAL TRAY, 16FR 10ML SIL FOLEY, URN: Brand: MEDLINE

## (undated) DEVICE — MARKER,SKIN,WI/RULER AND LABELS: Brand: MEDLINE

## (undated) DEVICE — SOLUTION IRRIGATION H2O 0797305] ICU MEDICAL INC]